# Patient Record
Sex: FEMALE | Race: BLACK OR AFRICAN AMERICAN | NOT HISPANIC OR LATINO | Employment: FULL TIME | URBAN - METROPOLITAN AREA
[De-identification: names, ages, dates, MRNs, and addresses within clinical notes are randomized per-mention and may not be internally consistent; named-entity substitution may affect disease eponyms.]

---

## 2018-02-28 ENCOUNTER — HISTORICAL (OUTPATIENT)
Dept: ADMINISTRATIVE | Facility: HOSPITAL | Age: 43
End: 2018-02-28

## 2018-02-28 LAB
HAV IGM SERPL QL IA: NONREACTIVE
HBV CORE IGM SERPL QL IA: NONREACTIVE
HBV SURFACE AG SERPL QL IA: NEGATIVE
HCV AB SERPL QL IA: NONREACTIVE
HIV 1+2 AB+HIV1 P24 AG SERPL QL IA: NONREACTIVE
T PALLIDUM AB SER QL: NONREACTIVE

## 2018-05-17 ENCOUNTER — HISTORICAL (OUTPATIENT)
Dept: ADMINISTRATIVE | Facility: HOSPITAL | Age: 43
End: 2018-05-17

## 2018-05-17 LAB
ABS NEUT (OLG): 2.3 X10(3)/MCL (ref 2.1–9.2)
BASOPHILS # BLD AUTO: 0.02 X10(3)/MCL
BASOPHILS NFR BLD AUTO: 0 %
EOSINOPHIL # BLD AUTO: 0.18 X10(3)/MCL
EOSINOPHIL NFR BLD AUTO: 4 %
ERYTHROCYTE [DISTWIDTH] IN BLOOD BY AUTOMATED COUNT: 13 % (ref 11.5–14.5)
HCT VFR BLD AUTO: 41.7 % (ref 35–46)
HGB BLD-MCNC: 12.7 GM/DL (ref 12–16)
IMM GRANULOCYTES # BLD AUTO: 0.01 10*3/UL
IMM GRANULOCYTES NFR BLD AUTO: 0 %
LYMPHOCYTES # BLD AUTO: 1.91 X10(3)/MCL
LYMPHOCYTES NFR BLD AUTO: 40 % (ref 13–40)
MCH RBC QN AUTO: 25.4 PG (ref 26–34)
MCHC RBC AUTO-ENTMCNC: 30.5 GM/DL (ref 31–37)
MCV RBC AUTO: 83.4 FL (ref 80–100)
MONOCYTES # BLD AUTO: 0.33 X10(3)/MCL
MONOCYTES NFR BLD AUTO: 7 % (ref 4–12)
NEUTROPHILS # BLD AUTO: 2.3 X10(3)/MCL
NEUTROPHILS NFR BLD AUTO: 48 X10(3)/MCL
PLATELET # BLD AUTO: 228 X10(3)/MCL (ref 130–400)
PMV BLD AUTO: 9.9 FL (ref 7.4–10.4)
RBC # BLD AUTO: 5 X10(6)/MCL (ref 4–5.2)
WBC # SPEC AUTO: 4.8 X10(3)/MCL (ref 4.5–11)

## 2018-07-17 ENCOUNTER — HISTORICAL (OUTPATIENT)
Dept: ADMINISTRATIVE | Facility: HOSPITAL | Age: 43
End: 2018-07-17

## 2018-07-17 LAB
ALBUMIN SERPL-MCNC: 4.1 GM/DL (ref 3.4–5)
ALBUMIN/GLOB SERPL: 1 RATIO (ref 1–2)
ALP SERPL-CCNC: 49 UNIT/L (ref 45–117)
ALT SERPL-CCNC: 16 UNIT/L (ref 12–78)
APPEARANCE, UA: CLEAR
AST SERPL-CCNC: 14 UNIT/L (ref 15–37)
BACTERIA #/AREA URNS AUTO: ABNORMAL /[HPF]
BILIRUB SERPL-MCNC: 0.6 MG/DL (ref 0.2–1)
BILIRUB UR QL STRIP: NEGATIVE
BILIRUBIN DIRECT+TOT PNL SERPL-MCNC: 0.2 MG/DL
BILIRUBIN DIRECT+TOT PNL SERPL-MCNC: 0.4 MG/DL
BUN SERPL-MCNC: 9 MG/DL (ref 7–18)
CALCIUM SERPL-MCNC: 9.2 MG/DL (ref 8.5–10.1)
CHLORIDE SERPL-SCNC: 106 MMOL/L (ref 98–107)
CHOLEST SERPL-MCNC: 134 MG/DL
CHOLEST/HDLC SERPL: 2.5 {RATIO} (ref 0–4.4)
CO2 SERPL-SCNC: 26 MMOL/L (ref 21–32)
COLOR UR: YELLOW
CREAT SERPL-MCNC: 0.9 MG/DL (ref 0.6–1.3)
EST. AVERAGE GLUCOSE BLD GHB EST-MCNC: 117 MG/DL
GLOBULIN SER-MCNC: 4.5 GM/ML (ref 2.3–3.5)
GLUCOSE (UA): NORMAL
GLUCOSE SERPL-MCNC: 79 MG/DL (ref 74–106)
HBA1C MFR BLD: 5.7 % (ref 4.2–6.3)
HDLC SERPL-MCNC: 53 MG/DL
HGB UR QL STRIP: NEGATIVE
HYALINE CASTS #/AREA URNS LPF: ABNORMAL /[LPF]
KETONES UR QL STRIP: NEGATIVE
LDLC SERPL CALC-MCNC: 69 MG/DL (ref 0–130)
LEUKOCYTE ESTERASE UR QL STRIP: 25 LEU/UL
NITRITE UR QL STRIP: ABNORMAL
PH UR STRIP: 6 [PH] (ref 4.5–8)
POTASSIUM SERPL-SCNC: 4 MMOL/L (ref 3.5–5.1)
PROT SERPL-MCNC: 8.6 GM/DL (ref 6.4–8.2)
PROT UR QL STRIP: NEGATIVE
RBC #/AREA URNS AUTO: ABNORMAL /[HPF]
SODIUM SERPL-SCNC: 140 MMOL/L (ref 136–145)
SP GR UR STRIP: 1.02 (ref 1–1.03)
SQUAMOUS #/AREA URNS LPF: ABNORMAL /[LPF]
TRIGL SERPL-MCNC: 58 MG/DL
TSH SERPL-ACNC: 1.84 MIU/L (ref 0.36–3.74)
UROBILINOGEN UR STRIP-ACNC: NORMAL
VLDLC SERPL CALC-MCNC: 12 MG/DL
WBC #/AREA URNS AUTO: ABNORMAL /HPF

## 2018-07-31 ENCOUNTER — HISTORICAL (OUTPATIENT)
Dept: RADIOLOGY | Facility: HOSPITAL | Age: 43
End: 2018-07-31

## 2018-10-09 ENCOUNTER — HISTORICAL (OUTPATIENT)
Dept: RADIOLOGY | Facility: HOSPITAL | Age: 43
End: 2018-10-09

## 2018-10-11 ENCOUNTER — HISTORICAL (OUTPATIENT)
Dept: INTERNAL MEDICINE | Facility: CLINIC | Age: 43
End: 2018-10-11

## 2018-11-06 ENCOUNTER — HISTORICAL (OUTPATIENT)
Dept: ADMINISTRATIVE | Facility: HOSPITAL | Age: 43
End: 2018-11-06

## 2018-11-06 LAB — DEPRECATED CALCIDIOL+CALCIFEROL SERPL-MC: 27.31 NG/ML (ref 30–80)

## 2018-11-20 ENCOUNTER — HISTORICAL (OUTPATIENT)
Dept: RADIOLOGY | Facility: HOSPITAL | Age: 43
End: 2018-11-20

## 2018-12-28 ENCOUNTER — HISTORICAL (OUTPATIENT)
Dept: RADIOLOGY | Facility: HOSPITAL | Age: 43
End: 2018-12-28

## 2019-02-07 ENCOUNTER — HISTORICAL (OUTPATIENT)
Dept: RADIOLOGY | Facility: HOSPITAL | Age: 44
End: 2019-02-07

## 2019-02-19 ENCOUNTER — HISTORICAL (OUTPATIENT)
Dept: RADIOLOGY | Facility: HOSPITAL | Age: 44
End: 2019-02-19

## 2019-08-08 LAB — POC BETA-HCG (QUAL): NEGATIVE

## 2019-09-24 ENCOUNTER — HISTORICAL (OUTPATIENT)
Dept: ADMINISTRATIVE | Facility: HOSPITAL | Age: 44
End: 2019-09-24

## 2019-09-24 LAB
ABS NEUT (OLG): 2.31 X10(3)/MCL (ref 2.1–9.2)
ALBUMIN SERPL-MCNC: 3.7 GM/DL (ref 3.4–5)
ALBUMIN/GLOB SERPL: 0.8 RATIO (ref 1.1–2)
ALP SERPL-CCNC: 54 UNIT/L (ref 45–117)
ALT SERPL-CCNC: 16 UNIT/L (ref 12–78)
AST SERPL-CCNC: 7 UNIT/L (ref 15–37)
BASOPHILS # BLD AUTO: 0 X10(3)/MCL (ref 0–0.2)
BASOPHILS NFR BLD AUTO: 0 %
BILIRUB SERPL-MCNC: 0.2 MG/DL (ref 0.2–1)
BILIRUBIN DIRECT+TOT PNL SERPL-MCNC: <0.1 MG/DL (ref 0–0.2)
BILIRUBIN DIRECT+TOT PNL SERPL-MCNC: ABNORMAL MG/DL
BUN SERPL-MCNC: 15 MG/DL (ref 7–18)
CALCIUM SERPL-MCNC: 8.7 MG/DL (ref 8.5–10.1)
CHLORIDE SERPL-SCNC: 107 MMOL/L (ref 98–107)
CO2 SERPL-SCNC: 31 MMOL/L (ref 21–32)
CREAT SERPL-MCNC: 0.9 MG/DL (ref 0.6–1.3)
DEPRECATED CALCIDIOL+CALCIFEROL SERPL-MC: 17.17 NG/ML (ref 30–80)
EOSINOPHIL # BLD AUTO: 0.1 X10(3)/MCL (ref 0–0.9)
EOSINOPHIL NFR BLD AUTO: 3 %
ERYTHROCYTE [DISTWIDTH] IN BLOOD BY AUTOMATED COUNT: 13.2 % (ref 11.5–14.5)
EST. AVERAGE GLUCOSE BLD GHB EST-MCNC: 117 MG/DL
GLOBULIN SER-MCNC: 4.5 GM/ML (ref 2.3–3.5)
GLUCOSE SERPL-MCNC: 82 MG/DL (ref 74–106)
HAV IGM SERPL QL IA: NONREACTIVE
HBA1C MFR BLD: 5.7 % (ref 4.2–6.3)
HBV CORE IGM SERPL QL IA: NONREACTIVE
HBV SURFACE AG SERPL QL IA: NEGATIVE
HCT VFR BLD AUTO: 42.6 % (ref 35–46)
HCV AB SERPL QL IA: NONREACTIVE
HGB BLD-MCNC: 13 GM/DL (ref 12–16)
HIV 1+2 AB+HIV1 P24 AG SERPL QL IA: NONREACTIVE
IMM GRANULOCYTES # BLD AUTO: 0.01 10*3/UL
IMM GRANULOCYTES NFR BLD AUTO: 0 %
LYMPHOCYTES # BLD AUTO: 2 X10(3)/MCL (ref 0.6–4.6)
LYMPHOCYTES NFR BLD AUTO: 41 %
MCH RBC QN AUTO: 25.8 PG (ref 26–34)
MCHC RBC AUTO-ENTMCNC: 30.5 GM/DL (ref 31–37)
MCV RBC AUTO: 84.7 FL (ref 80–100)
MONOCYTES # BLD AUTO: 0.4 X10(3)/MCL (ref 0.1–1.3)
MONOCYTES NFR BLD AUTO: 8 %
NEUTROPHILS # BLD AUTO: 2.31 X10(3)/MCL (ref 2.1–9.2)
NEUTROPHILS NFR BLD AUTO: 47 %
PLATELET # BLD AUTO: 226 X10(3)/MCL (ref 130–400)
PMV BLD AUTO: 10 FL (ref 7.4–10.4)
POTASSIUM SERPL-SCNC: 3.8 MMOL/L (ref 3.5–5.1)
PROT SERPL-MCNC: 7.3 GM/DL
PROT SERPL-MCNC: 8.2 GM/DL (ref 6.4–8.2)
RBC # BLD AUTO: 5.03 X10(6)/MCL (ref 4–5.2)
SODIUM SERPL-SCNC: 142 MMOL/L (ref 136–145)
T PALLIDUM AB SER QL: NONREACTIVE
TSH SERPL-ACNC: 1.53 MIU/L (ref 0.36–3.74)
WBC # SPEC AUTO: 4.9 X10(3)/MCL (ref 4.5–11)

## 2020-01-16 ENCOUNTER — HISTORICAL (OUTPATIENT)
Dept: RADIOLOGY | Facility: HOSPITAL | Age: 45
End: 2020-01-16

## 2020-02-06 ENCOUNTER — HISTORICAL (OUTPATIENT)
Dept: ENDOSCOPY | Facility: HOSPITAL | Age: 45
End: 2020-02-06

## 2020-02-06 LAB — CRC RECOMMENDATION EXT: NORMAL

## 2020-08-11 LAB — POC BETA-HCG (QUAL): NEGATIVE

## 2020-10-20 ENCOUNTER — HISTORICAL (OUTPATIENT)
Dept: ADMINISTRATIVE | Facility: HOSPITAL | Age: 45
End: 2020-10-20

## 2020-10-20 LAB
ABS NEUT (OLG): 1.97 X10(3)/MCL (ref 2.1–9.2)
BASOPHILS # BLD AUTO: 0 X10(3)/MCL (ref 0–0.2)
BASOPHILS NFR BLD AUTO: 0 %
BUN SERPL-MCNC: 10 MG/DL (ref 7–18)
CALCIUM SERPL-MCNC: 9 MG/DL (ref 8.5–10.1)
CHLORIDE SERPL-SCNC: 111 MMOL/L (ref 98–107)
CO2 SERPL-SCNC: 27 MMOL/L (ref 21–32)
CREAT SERPL-MCNC: 0.9 MG/DL (ref 0.6–1.3)
CREAT/UREA NIT SERPL: 11 MG/DL (ref 12–14)
EOSINOPHIL # BLD AUTO: 0.2 X10(3)/MCL (ref 0–0.9)
EOSINOPHIL NFR BLD AUTO: 4 %
ERYTHROCYTE [DISTWIDTH] IN BLOOD BY AUTOMATED COUNT: 13 % (ref 11.5–14.5)
FERRITIN SERPL-MCNC: 45.8 NG/ML (ref 10–150)
GLUCOSE SERPL-MCNC: 93 MG/DL (ref 74–106)
HCT VFR BLD AUTO: 42.3 % (ref 35–46)
HGB BLD-MCNC: 13.2 GM/DL (ref 12–16)
IMM GRANULOCYTES # BLD AUTO: 0.01 10*3/UL
IMM GRANULOCYTES NFR BLD AUTO: 0 %
IRON SATN MFR SERPL: 23 % (ref 15–50)
IRON SERPL-MCNC: 72 MCG/DL (ref 50–170)
LYMPHOCYTES # BLD AUTO: 2.1 X10(3)/MCL (ref 0.6–4.6)
LYMPHOCYTES NFR BLD AUTO: 46 %
MCH RBC QN AUTO: 26 PG (ref 26–34)
MCHC RBC AUTO-ENTMCNC: 31.2 GM/DL (ref 31–37)
MCV RBC AUTO: 83.3 FL (ref 80–100)
MONOCYTES # BLD AUTO: 0.3 X10(3)/MCL (ref 0.1–1.3)
MONOCYTES NFR BLD AUTO: 6 %
NEUTROPHILS # BLD AUTO: 1.97 X10(3)/MCL (ref 2.1–9.2)
NEUTROPHILS NFR BLD AUTO: 43 %
PLATELET # BLD AUTO: 240 X10(3)/MCL (ref 130–400)
PMV BLD AUTO: 9.7 FL (ref 7.4–10.4)
POTASSIUM SERPL-SCNC: 3.8 MMOL/L (ref 3.5–5.1)
RBC # BLD AUTO: 5.08 X10(6)/MCL (ref 4–5.2)
RET# (OHS): 0.05 X10(6)/MCL (ref 0.02–0.08)
RETICULOCYTE COUNT AUTOMATED (OLG): 0.9 % (ref 0.5–1.5)
SODIUM SERPL-SCNC: 142 MMOL/L (ref 136–145)
TIBC SERPL-MCNC: 312 MCG/DL (ref 250–450)
TRANSFERRIN SERPL-MCNC: 247 MG/DL (ref 200–360)
WBC # SPEC AUTO: 4.6 X10(3)/MCL (ref 4.5–11)

## 2020-11-19 LAB — POC BETA-HCG (QUAL): NEGATIVE

## 2021-01-21 ENCOUNTER — HISTORICAL (OUTPATIENT)
Dept: ADMINISTRATIVE | Facility: HOSPITAL | Age: 46
End: 2021-01-21

## 2021-02-10 ENCOUNTER — HISTORICAL (OUTPATIENT)
Dept: RADIOLOGY | Facility: HOSPITAL | Age: 46
End: 2021-02-10

## 2021-04-01 ENCOUNTER — HISTORICAL (OUTPATIENT)
Dept: ADMINISTRATIVE | Facility: HOSPITAL | Age: 46
End: 2021-04-01

## 2021-08-10 LAB
HPV16+18+H RISK 12 DNA CVX-IMP: NEGATIVE
PAP RECOMMENDATION EXT: NORMAL
PAP SMEAR: NORMAL

## 2021-11-15 ENCOUNTER — PATIENT OUTREACH (OUTPATIENT)
Dept: EMERGENCY MEDICINE | Facility: HOSPITAL | Age: 46
End: 2021-11-15

## 2021-11-16 ENCOUNTER — HISTORICAL (OUTPATIENT)
Dept: ADMINISTRATIVE | Facility: HOSPITAL | Age: 46
End: 2021-11-16

## 2021-12-07 ENCOUNTER — HISTORICAL (OUTPATIENT)
Dept: ADMINISTRATIVE | Facility: HOSPITAL | Age: 46
End: 2021-12-07

## 2021-12-20 ENCOUNTER — PATIENT OUTREACH (OUTPATIENT)
Dept: EMERGENCY MEDICINE | Facility: HOSPITAL | Age: 46
End: 2021-12-20

## 2022-01-04 ENCOUNTER — HISTORICAL (OUTPATIENT)
Dept: ADMINISTRATIVE | Facility: HOSPITAL | Age: 47
End: 2022-01-04

## 2022-04-09 ENCOUNTER — HISTORICAL (OUTPATIENT)
Dept: ADMINISTRATIVE | Facility: HOSPITAL | Age: 47
End: 2022-04-09
Payer: MEDICAID

## 2022-04-27 VITALS
OXYGEN SATURATION: 98 % | WEIGHT: 260.13 LBS | DIASTOLIC BLOOD PRESSURE: 88 MMHG | SYSTOLIC BLOOD PRESSURE: 125 MMHG | HEIGHT: 67 IN | BODY MASS INDEX: 40.83 KG/M2

## 2022-05-17 ENCOUNTER — CLINICAL SUPPORT (OUTPATIENT)
Dept: GYNECOLOGY | Facility: CLINIC | Age: 47
End: 2022-05-17
Payer: MEDICAID

## 2022-05-17 DIAGNOSIS — N93.9 ABNORMAL UTERINE BLEEDING (AUB): Primary | ICD-10-CM

## 2022-05-17 PROCEDURE — 99212 OFFICE O/P EST SF 10 MIN: CPT | Mod: PBBFAC

## 2022-05-17 PROCEDURE — 96372 THER/PROPH/DIAG INJ SC/IM: CPT | Mod: PBBFAC

## 2022-05-17 RX ORDER — MEDROXYPROGESTERONE ACETATE 150 MG/ML
150 INJECTION, SUSPENSION INTRAMUSCULAR
Status: COMPLETED | OUTPATIENT
Start: 2022-05-17 | End: 2022-05-17

## 2022-05-17 RX ORDER — MEDROXYPROGESTERONE ACETATE 150 MG/ML
150 INJECTION, SUSPENSION INTRAMUSCULAR
Status: CANCELLED | OUTPATIENT
Start: 2022-05-17 | End: 2022-05-17

## 2022-05-17 RX ADMIN — MEDROXYPROGESTERONE ACETATE 150 MG: 150 INJECTION, SUSPENSION INTRAMUSCULAR at 10:05

## 2022-05-17 NOTE — LETTER
May 17, 2022      [facility]  [address] [address]  Phone: [phone]       Patient: Tara Potts   YOB: 1975  Date of Visit: 05/17/2022    To Whom It May Concern:    Dhara Potts  was at [facility] on 05/17/2022. The patient may return to work/school on 05/17/2022 no restrictions. If you have any questions or concerns, or if I can be of further assistance, please do not hesitate to contact me.    Sincerely,    Dr Tracy Avelar

## 2022-05-19 ENCOUNTER — TELEPHONE (OUTPATIENT)
Dept: GYNECOLOGY | Facility: CLINIC | Age: 47
End: 2022-05-19
Payer: MEDICAID

## 2022-06-16 ENCOUNTER — CLINICAL SUPPORT (OUTPATIENT)
Dept: GYNECOLOGY | Facility: CLINIC | Age: 47
End: 2022-06-16
Payer: MEDICAID

## 2022-06-16 DIAGNOSIS — Z23 NEED FOR HPV VACCINATION: Primary | ICD-10-CM

## 2022-06-16 PROCEDURE — 90471 IMMUNIZATION ADMIN: CPT | Mod: PBBFAC

## 2022-06-16 PROCEDURE — 90651 9VHPV VACCINE 2/3 DOSE IM: CPT | Mod: PBBFAC

## 2022-06-16 PROCEDURE — 99212 OFFICE O/P EST SF 10 MIN: CPT | Mod: PBBFAC

## 2022-06-16 RX ADMIN — HUMAN PAPILLOMAVIRUS 9-VALENT VACCINE, RECOMBINANT 0.5 ML: 30; 40; 60; 40; 20; 20; 20; 20; 20 INJECTION, SUSPENSION INTRAMUSCULAR at 09:06

## 2022-06-22 ENCOUNTER — HOSPITAL ENCOUNTER (OUTPATIENT)
Dept: RADIOLOGY | Facility: HOSPITAL | Age: 47
Discharge: HOME | End: 2022-06-22
Attending: FAMILY MEDICINE
Payer: MEDICAID

## 2022-06-22 ENCOUNTER — TELEPHONE (OUTPATIENT)
Dept: INTERNAL MEDICINE | Facility: CLINIC | Age: 47
End: 2022-06-22
Payer: MEDICAID

## 2022-06-22 ENCOUNTER — OFFICE VISIT (OUTPATIENT)
Dept: ORTHOPEDICS | Facility: CLINIC | Age: 47
End: 2022-06-22
Payer: MEDICAID

## 2022-06-22 VITALS
SYSTOLIC BLOOD PRESSURE: 145 MMHG | BODY MASS INDEX: 41.78 KG/M2 | DIASTOLIC BLOOD PRESSURE: 98 MMHG | WEIGHT: 260 LBS | HEIGHT: 66 IN

## 2022-06-22 DIAGNOSIS — M77.31 CALCANEAL SPUR OF FOOT, RIGHT: ICD-10-CM

## 2022-06-22 DIAGNOSIS — M72.2 PLANTAR FASCIITIS OF RIGHT FOOT: Primary | ICD-10-CM

## 2022-06-22 DIAGNOSIS — M79.671 PAIN IN RIGHT FOOT: ICD-10-CM

## 2022-06-22 PROCEDURE — 73630 X-RAY EXAM OF FOOT: CPT | Mod: TC,RT

## 2022-06-22 PROCEDURE — 99213 OFFICE O/P EST LOW 20 MIN: CPT | Mod: PBBFAC

## 2022-06-22 RX ORDER — MELOXICAM 7.5 MG/1
7.5 TABLET ORAL 2 TIMES DAILY PRN
Qty: 60 TABLET | Refills: 0 | Status: SHIPPED | OUTPATIENT
Start: 2022-06-22 | End: 2022-08-17

## 2022-06-22 RX ORDER — SERTRALINE HYDROCHLORIDE 25 MG/1
25 TABLET, FILM COATED ORAL DAILY
COMMUNITY
Start: 2022-04-10

## 2022-06-22 RX ORDER — MEDROXYPROGESTERONE ACETATE 150 MG/ML
INJECTION, SUSPENSION INTRAMUSCULAR
COMMUNITY
End: 2022-08-17 | Stop reason: SDUPTHER

## 2022-06-22 NOTE — PROGRESS NOTES
Subjective:     Chief Complaint: Tara Potts is a 46 y.o. female who had concerns including Pain of the Right Foot.      Interval history:   1 year history of plantar fasciitis in R foot, worsened after fracture of L 5th metatarsal last year and she was required to compensate with R foot for pain. Symptoms previously under control with daily stretching, PRN NSAIDs, but worsened after being relatively immobile 2/2 COVID a few months ago Tried multiple stretching exercises, shoe inserts, topical Voltaren, and lately, has been taking about 2000mg Tylenol, 800mg Ibuprofen three times daily for the last 2 months. Never been to physical therapy. Pain at present 6/10, moderate in severity. Gait unimpaired.       Review of Systems   Musculoskeletal: Negative for falls.   Neurological: Negative for loss of balance, numbness and paresthesias.        Objective:     Vitals:    06/22/22 0850   BP: (!) 145/98       General: Tara is well-developed, well-nourished, appears stated age, in no acute distress, alert and oriented to time, place and person.           Right Ankle/Foot Exam     Tenderness   The patient is tender to palpation of the plantar arch.    Pain   The patient exhibits pain of the plantar arch.    Range of Motion   Ankle Joint   Dorsiflexion:  25 normal   Plantar flexion: 45   Subtalar Joint   Inversion: normal   Eversion: normal   First MTP Joint: normal    Muscle Strength   The patient has normal right ankle strength.    Other   Sensation: normal    Left Ankle/Foot Exam     Range of Motion   Ankle Joint  Dorsiflexion: 25   Plantar flexion: 45     Subtalar Joint   Inversion: normal   Eversion: normal   First MTP Joint: normal    Muscle Strength   The patient has normal left ankle strength.    Other   Sensation: normal      Vascular Exam     Right Pulses  Dorsalis Pedis:      2+  Posterior Tibial:      2+        Left Pulses  Dorsalis Pedis:      2+  Posterior Tibial:      2+        Imaging:   EXAMINATION:  XR  FOOT COMPLETE 3 VIEW RIGHT     CLINICAL HISTORY:  . Pain in right foot     TECHNIQUE:  AP, lateral, and oblique views of the right foot were performed.     COMPARISON:  None     FINDINGS:  There are no fractures seen.  There is no dislocation.  There is spurring of the plantar aspect of the calcaneus.     Impression:     No acute abnormalities are seen.        Electronically signed by: David Herrera MD  Date:                                            06/22/2022  Time:                                           09:15      Assessment:     1. Plantar fasciitis of right foot    2. Calcaneal spur of foot, right      Plan:        Dx: Plantar fasciitis, right foot. Chronic, moderate exacerbation. Discussed with patient diagnosis and treatment recommendations.   Imaging: prior radiological studies independently reviewed; discussed with patient; agree with radiologist interpretation.   Treatment Plan: Formal physical therapy x 12 weeks, shoe inserts, HEP, Mobic 7.5mg BID PRN.   Weight Management: is paramount. Recommend at least 10lbs weight loss .   Procedure: Discussed CSI/VSI as treatment options; patient not a candidate for CSI/VSI.   Activity: Activity as tolerated; HEP to include aerobic conditioning and strength training with non-painful activity. ROM/STG exercises. Proper footware; assistive devises to avoid limping.   Therapy: Physical Therapy.   Medication: start Mobic 7.5mg BID PRN; medication precautions given. Discussed proper dosing of OTC Tylenol, ibuprofen. Instructed to hold ibuprofen while taking meloxicam. If taking Tylenol, recommended 1000 mg TID for 3 days to receive anti-inflammatory benefit.    RTC: PRN; As needed.         Delfino Mcrae M.D. -III  [facility]      Patient questionnaires may have been collected.

## 2022-06-22 NOTE — LETTER
Therapy Prescription  [facility]  Office Phone: [phone]  Office Fax: [phone]      Patient Name: Tara Potts  : 1975  MRN: [id number]  Date of Service: 2022    Type of Therapy:  Physical Therapy    Reasons for therapy:  Evaluate & Treat  Modality of Choice  Home Exercise Program    Duration:  Three times per week for twelve weeks    Goals:  Improve range of motion  Improve Strength  Improve Mobility  Improve Function  Decrease Symptoms    Diagnosis:  Plantar Fasciitis, right    Provider:  Delfino Mcrae M.D.

## 2022-06-29 ENCOUNTER — TELEPHONE (OUTPATIENT)
Dept: FAMILY MEDICINE | Facility: CLINIC | Age: 47
End: 2022-06-29
Payer: MEDICAID

## 2022-06-29 NOTE — TELEPHONE ENCOUNTER
Physical therapist called, patient insurance is not in network, unable to accept patient.  Thank you.

## 2022-07-01 NOTE — PROGRESS NOTES
Faculty Attestation: Tara Potts  was seen in Sports Medicine Clinic. Patient seen and evaluated at the time of the visit. History of Present Illness, Physical Exam, and Assessment and Plan reviewed. Treatment plan is reasonable and appropriate. Compliance with treatment recommendations is important.  Radiology images independently reviewed and agree with radiologist interpretation.       Stephani Fagan MD  Sports Medicine

## 2022-07-05 ENCOUNTER — PATIENT OUTREACH (OUTPATIENT)
Dept: EMERGENCY MEDICINE | Facility: HOSPITAL | Age: 47
End: 2022-07-05
Payer: MEDICAID

## 2022-07-05 ENCOUNTER — PATIENT MESSAGE (OUTPATIENT)
Dept: EMERGENCY MEDICINE | Facility: HOSPITAL | Age: 47
End: 2022-07-05
Payer: MEDICAID

## 2022-07-05 NOTE — PROGRESS NOTES
Spoke with patient,had a discussion  regarding her PCP and needing a much needed follow up appointment. Reminded her that it has been close to 3 years since her last visit with Dr Delvin Connolly 10/20/20. States her feet has been causing her pain and she is also having issues with carpal tunnel. She did have a recent visit 06/22/22 in Ortho/Sports Med for an evaluation on her feet. She was instructed to start going to physical therapy due to left foot plantar fasciitis and right foot bone spur. No injections were given but was prescribed Mobic for pain along with Tylenol. Has not started going to formal PT ,says she is unable to find one that accepts Medicaid. Informed patient that I would mail her a list of PT providers that accepts her insurance.States she was told by Ortho her B/P is a little on the high side and she needs to follow up with her PCP for management. She did called the IM Clinic to scheduled an appointment but has not heard back regarding an appointment. Contacted IM Clinic so they can reach out to patient to scheduled a follow up appointment. Explained to patient ,she may be assigned a new PCP since it has been over 2 years since her last visit with Dr Connolly. Advised to monitor her B/P at home and keep a log of readings to have for doctor visit. and to utilized [facility] for non urgent issues until PCP visit. Voices understanding.

## 2022-08-16 ENCOUNTER — CLINICAL SUPPORT (OUTPATIENT)
Dept: GYNECOLOGY | Facility: CLINIC | Age: 47
End: 2022-08-16
Payer: MEDICAID

## 2022-08-16 DIAGNOSIS — N93.9 ABNORMAL UTERINE BLEEDING (AUB): Primary | ICD-10-CM

## 2022-08-16 PROCEDURE — 99212 OFFICE O/P EST SF 10 MIN: CPT | Mod: PBBFAC

## 2022-08-16 PROCEDURE — 96372 THER/PROPH/DIAG INJ SC/IM: CPT | Mod: PBBFAC

## 2022-08-16 RX ORDER — MEDROXYPROGESTERONE ACETATE 150 MG/ML
150 INJECTION, SUSPENSION INTRAMUSCULAR
Status: COMPLETED | OUTPATIENT
Start: 2022-08-16 | End: 2022-08-16

## 2022-08-16 RX ADMIN — MEDROXYPROGESTERONE ACETATE 150 MG: 150 INJECTION, SUSPENSION, EXTENDED RELEASE INTRAMUSCULAR at 08:08

## 2022-08-16 NOTE — PROGRESS NOTES
Depo-provera injection administered per Katie Castano NP order set abstracted from [facility]. Patient tolerated well and left in stable condition.

## 2022-08-17 ENCOUNTER — OFFICE VISIT (OUTPATIENT)
Dept: GYNECOLOGY | Facility: CLINIC | Age: 47
End: 2022-08-17
Payer: MEDICAID

## 2022-08-17 VITALS
RESPIRATION RATE: 18 BRPM | TEMPERATURE: 98 F | OXYGEN SATURATION: 98 % | BODY MASS INDEX: 43.33 KG/M2 | HEART RATE: 82 BPM | HEIGHT: 66 IN | SYSTOLIC BLOOD PRESSURE: 142 MMHG | WEIGHT: 269.63 LBS | DIASTOLIC BLOOD PRESSURE: 91 MMHG

## 2022-08-17 DIAGNOSIS — N93.9 ABNORMAL UTERINE BLEEDING (AUB): ICD-10-CM

## 2022-08-17 DIAGNOSIS — Z12.31 VISIT FOR SCREENING MAMMOGRAM: ICD-10-CM

## 2022-08-17 DIAGNOSIS — Z01.419 ENCOUNTER FOR ANNUAL ROUTINE GYNECOLOGICAL EXAMINATION: Primary | ICD-10-CM

## 2022-08-17 PROCEDURE — 3008F PR BODY MASS INDEX (BMI) DOCUMENTED: ICD-10-PCS | Mod: CPTII,,, | Performed by: NURSE PRACTITIONER

## 2022-08-17 PROCEDURE — 1160F RVW MEDS BY RX/DR IN RCRD: CPT | Mod: CPTII,,, | Performed by: NURSE PRACTITIONER

## 2022-08-17 PROCEDURE — 1159F PR MEDICATION LIST DOCUMENTED IN MEDICAL RECORD: ICD-10-PCS | Mod: CPTII,,, | Performed by: NURSE PRACTITIONER

## 2022-08-17 PROCEDURE — 3077F SYST BP >= 140 MM HG: CPT | Mod: CPTII,,, | Performed by: NURSE PRACTITIONER

## 2022-08-17 PROCEDURE — 99396 PREV VISIT EST AGE 40-64: CPT | Mod: S$PBB,,, | Performed by: NURSE PRACTITIONER

## 2022-08-17 PROCEDURE — 1159F MED LIST DOCD IN RCRD: CPT | Mod: CPTII,,, | Performed by: NURSE PRACTITIONER

## 2022-08-17 PROCEDURE — 3080F PR MOST RECENT DIASTOLIC BLOOD PRESSURE >= 90 MM HG: ICD-10-PCS | Mod: CPTII,,, | Performed by: NURSE PRACTITIONER

## 2022-08-17 PROCEDURE — 99213 OFFICE O/P EST LOW 20 MIN: CPT | Mod: PBBFAC | Performed by: NURSE PRACTITIONER

## 2022-08-17 PROCEDURE — 1160F PR REVIEW ALL MEDS BY PRESCRIBER/CLIN PHARMACIST DOCUMENTED: ICD-10-PCS | Mod: CPTII,,, | Performed by: NURSE PRACTITIONER

## 2022-08-17 PROCEDURE — 99396 PR PREVENTIVE VISIT,EST,40-64: ICD-10-PCS | Mod: S$PBB,,, | Performed by: NURSE PRACTITIONER

## 2022-08-17 PROCEDURE — 3008F BODY MASS INDEX DOCD: CPT | Mod: CPTII,,, | Performed by: NURSE PRACTITIONER

## 2022-08-17 PROCEDURE — 3077F PR MOST RECENT SYSTOLIC BLOOD PRESSURE >= 140 MM HG: ICD-10-PCS | Mod: CPTII,,, | Performed by: NURSE PRACTITIONER

## 2022-08-17 PROCEDURE — 3080F DIAST BP >= 90 MM HG: CPT | Mod: CPTII,,, | Performed by: NURSE PRACTITIONER

## 2022-08-17 RX ORDER — MEDROXYPROGESTERONE ACETATE 150 MG/ML
150 INJECTION, SUSPENSION INTRAMUSCULAR
Status: DISCONTINUED | OUTPATIENT
Start: 2022-11-17 | End: 2023-08-18

## 2022-08-17 NOTE — PROGRESS NOTES
"  Subjective:       Patient ID: Tara Potts is a 46 y.o. female.    Chief Complaint:  Well Woman    History of Present Illness  Pt is  here for annual gyn exam. Hx of mild dysplasia 18 yrs ago with cryotherapy. All paps since negative. Last pap 3/2021-NIL; HPV negative. Pt with hx of TL for contraception. Currently on depo provera for AUB-L since off and on since . Pt is amenorrheic and wishes to continue Depo. Denies any vaginal bleeding or spotting. Denies vaginal discharge, itching and declines any STI screening today, no sexually active. Hx of renal stent in the past secondary to chronic UTIs. MG-2/10/21-BIRADS 1. Denies breast complaints. Denies tobacco use. HPV vaccine completed. Denies fly hx of breast, ovarian or uterine cancer. Admits to [facility] and Mat uncle with colon cancer. Breast cancer in paternal aunt.    GYN & OB History  No LMP recorded. Patient has had an injection.     Review of patient's allergies indicates:   Allergen Reactions    Sulfa (sulfonamide antibiotics) Hives, Itching, Nausea Only, Palpitations, Rash and Shortness Of Breath     Past Medical History:   Diagnosis Date    Abnormal Pap smear of cervix     Breast disorder     Hypertension      OB History    Para Term  AB Living   4 4           SAB IAB Ectopic Multiple Live Births                  # Outcome Date GA Lbr Tony/2nd Weight Sex Delivery Anes PTL Lv   4 Para            3 Para            2 Para            1 Para                 Review of Systems  Review of Systems    Negative except for pertinent findings for positives per HPI     Objective:    Physical Exam    BP (!) 142/91 (BP Location: Left arm, Patient Position: Sitting, BP Method: Large (Automatic))   Pulse 82   Temp 98.2 °F (36.8 °C)   Resp 18   Ht 5' 6" (1.676 m)   Wt 122.3 kg (269 lb 9.6 oz)   SpO2 98%   BMI 43.51 kg/m²   GENERAL: Well-developed female in no acute distress.  SKIN: Normal to inspection, warm and intact.  BREASTS: No masses, " lumps, discharge, tenderness.  VULVA: General appearance normal; external genitalia with no lesions or erythema.  VAGINA: Mucosa normal, pink, no discharge or lesions.  CERVIX: Grossly normal, pink, no erythema or discharge.  BIMANUAL EXAM: Limited exam d/t body habitus. Non tender. Nicolas adnexa reveal no evidence of masses, tenderness.  PSYCHIATRIC: Patient is oriented to person, place, and time. Mood and affect are normal.    Assessment:       1. Encounter for annual routine gynecological examination    2. Abnormal uterine bleeding (AUB)    3. Visit for screening mammogram       Plan:   Tara was seen today for well woman.    Diagnoses and all orders for this visit:    Encounter for annual routine gynecological examination    Abnormal uterine bleeding (AUB)  -     medroxyPROGESTERone (DEPO-PROVERA) injection 150 mg    Visit for screening mammogram  -     Mammo Digital Screening Bilat; Future    Pelvic today, pap utd per ACOG  MMG ordered.  Continue Depo. Long term use of Depo can cause bone weakening. Diet high in calcium and vit. D. Pt instructed to increase dietary calcium and vit. D intake. Encouraged dietary calcium rich foods like broccoli, mustard, turnip greens, oranges, dairy products, sardines, salmon, and almonds. Encouraged Vit. D absorption with 30 mins in sun 5 days/week and fatty fish. Increase muscle strengthening and aerobic exercise (walking, climbing, bicycle) at least 30 mins for 3 times/week.   Follow up in about 1 year (around 8/17/2023) for annual exam.

## 2022-08-23 ENCOUNTER — OFFICE VISIT (OUTPATIENT)
Dept: INTERNAL MEDICINE | Facility: CLINIC | Age: 47
End: 2022-08-23
Payer: MEDICAID

## 2022-08-23 DIAGNOSIS — I10 PRIMARY HYPERTENSION: ICD-10-CM

## 2022-08-23 DIAGNOSIS — Z87.310 H/O HEALED FRAGILITY FRACTURE: ICD-10-CM

## 2022-08-23 DIAGNOSIS — M72.2 PLANTAR FASCIITIS: ICD-10-CM

## 2022-08-23 DIAGNOSIS — Z00.00 WELLNESS EXAMINATION: ICD-10-CM

## 2022-08-23 DIAGNOSIS — D64.9 ANEMIA, UNSPECIFIED TYPE: ICD-10-CM

## 2022-08-23 DIAGNOSIS — S92.902D CLOSED FRACTURE OF LEFT FOOT WITH ROUTINE HEALING, SUBSEQUENT ENCOUNTER: ICD-10-CM

## 2022-08-23 DIAGNOSIS — J12.82 PNEUMONIA DUE TO COVID-19 VIRUS: ICD-10-CM

## 2022-08-23 DIAGNOSIS — U07.1 PNEUMONIA DUE TO COVID-19 VIRUS: ICD-10-CM

## 2022-08-23 DIAGNOSIS — N93.8 DYSFUNCTIONAL UTERINE BLEEDING: Primary | ICD-10-CM

## 2022-08-23 DIAGNOSIS — E66.9 OBESITY, UNSPECIFIED CLASSIFICATION, UNSPECIFIED OBESITY TYPE, UNSPECIFIED WHETHER SERIOUS COMORBIDITY PRESENT: ICD-10-CM

## 2022-08-23 PROBLEM — S92.909A FOOT FRACTURE: Status: ACTIVE | Noted: 2022-08-23

## 2022-08-23 PROBLEM — E66.01 MORBID OBESITY: Status: ACTIVE | Noted: 2022-08-23

## 2022-08-23 PROCEDURE — 99214 OFFICE O/P EST MOD 30 MIN: CPT | Mod: PBBFAC | Performed by: INTERNAL MEDICINE

## 2022-08-23 RX ORDER — HYDROCHLOROTHIAZIDE 12.5 MG/1
12.5 TABLET ORAL DAILY
Qty: 30 TABLET | Refills: 11 | Status: SHIPPED | OUTPATIENT
Start: 2022-08-23 | End: 2023-08-23
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
## 2022-09-14 NOTE — PROGRESS NOTES
[facility] Care Entered On:  1/19/2022 11:58 CST    Performed On:  1/19/2022 11:19 CST by Sirisha Keller LPN               Discharge Past 30 Days   Visit to the Hospital in the Last 30 Days :   Emergency department (ED) visit   Reason for Choosing ED for Care :   Believes the problem too serious for doctor office or clinic   Perception of Change in Health Status DC :   Improving   Discharged To :   Home independently   DC Instructions :   Received discharge instructions , Understands discharge instructions    Education Provided :   ED utilization, Importance of keeping appointments, Medication Compliance, Diet Compliance   Discharge Past 30 Days Addntl Comments :   Called and spoke with patient ,recently tested positive for COVID 01/17/22. Says her symptoms are improving, reports she is still coughing really bad but has only started taking the Rx cough medication yesterday cause the pharmacy had to ordered the medication. Instructed to get plenty of rest and to stay hydrated drinking plenty of fluids and also to remain in quarantine until she no longer having any symptoms. Has not yet called her PCP for a follow up. Stressed the importance of re-establishing healthcare with Dr Laws in IM Clinic at [facility] so her Rx medication can be written without any lapsed or missed dosages.  Informed her that it has been over 1 year since last visit with PCP. Advised to remained compliant in taking prescribed medications and continue with COVID precations such as social distancing ,wearing mask ,good hand washing. Advised to utilized C for non urgent matters but if her symptoms should worsen reports to the ED. Voices understanding.     Sirisha Keller LPN - 1/19/2022 11:19 CST   Barriers to Care   [facility] Eat Less Than You Should :   No   [facility] Shut Off Services to Your Home :   No   [facility] No Regular Place to Live :   No   SDoH Needed Provider but Costs too Much :   No   SDoH Help Reading and Writing Paper Work :   No   SDoH Feel  Lonely Often :   No   [facility] Missed Appt/Meds- No Transportation :   No   [facility]oH Call Dr To Be Seen Right Away :   Yes   [facility] Medical Problems Cause ED Visit :   No   [facility] Other Problems Affecting Health :   No   [facility] Reviewed :   Yes   SDoH Dentist Seen in Last Year :   No   Sirisha Keller LPN 1/19/2022 11:19 CST   Prescriptions   Medication Reconcilation Completed :   Unknown   Medication Prescriptions Filled After DC :   Confirms all medications filled , Confirms taking medications as prescribed    Questions About Meds Prescribed at DC :   Denies any questions or concerns                    Sirisha Keller LPN - 1/19/2022 11:19 CST   Appointments   Follow-Up Appt Scheduled :   No   Follow-Up Appointment Status :   Has not had opportunity to call for appointment   PCP Visit Upcoming :   No   PCP Visit Within Year :   No   Follow-Up Specialist Appt Scheduled :   Yes   Type of Specialist :   [facility] GYN NP 2  02/15/2022   Follow-Up Lab Appt Scheduled :   No   Follow-Up Date :   2/15/2022 CST   Follow-Up Radiology Appt Scheduled :   Yes   Radiology Orders :   [facility] Mammo 02/15/2022   Sirisha Keller LPN 1/19/2022 11:19 CST   Navigation   Initial Assesment Completed By :   Phone   ED FIN :   [id number]   [facility] Navigation Call Log :   Second follow up call   Referral To HE Care :   NA   Transportation Arrangements Made :   Yes   Providers Patient Visited Last Year :   Katie Crowder   Root Causes for High-ED Utilization :   Lack of access to care   Plan: :   Educated patient on process of establishing PCP, Educated on appropriate ED utilization, Educated on alternate means of health care; ie urgent care when PCP not available, Educated on importance of follow up care with PCP, Educated on importance of follow up preventative dental care with dentist, Budget-friendly health eating education given   Participation in Activity Designed to Address Lack of Annual Ambulatory  or Preventative Care Visit? :   Yes   Participation in Activity Designed to Address Avoidable ED Utilization? :   Yes   During Current Measurement Year, Did Enrollee Receive Education Regarding Outpatient Primary Care Options? :   Yes   During Current Measurement Year, Did Enrollee Receive an Appt Reminder 24-48 Hours Before a Scheduled Appt? :   Yes   During Current Measurement Year, Did the Network Provider Schedule and Appt or Provide a Referral to Enrollee? :   Yes   Education Provided :   Verbal, Written   Sirisha Keller LPN - 1/19/2022 11:19 CST     Result type: [facility] - Text  Result date: January 19, 2022 11:19 CST  Result status: Auth (Verified)  Result title: [facility]  Performed by: Sirisha Keller LPN on January 19, 2022 11:19 CST  Verified by: Sirisha Keller LPN on January 19, 2022 11:19 CST  Encounter info: [id number], [facility]NAGEMENT, Utilization Coordination, 11/12/2021 -

## 2022-09-14 NOTE — PROGRESS NOTES
Original encounter date 11/15/2021 in [employer] EMR. Information placed in Epic for continuity purposes.      HealthE Care Entered On:  11/15/2021 11:12 CST    Performed On:  11/15/2021 10:53 CST by Sirisha Keller LPN               Discharge Past 30 Days   Visit to the Hospital in the Last 30 Days :   Emergency department (ED) visit   Reason for Choosing ED for Care :   Believes the problem too serious for doctor office or clinic   Perception of Change in Health Status DC :   Improving   Discharged To :   Home independently   DC Instructions :   Received discharge instructions , Understands discharge instructions    Education Provided :   ED utilization, Importance of keeping appointments, Medication Compliance, Diet Compliance   Discharge Past 30 Days Addntl Comments :   Called and spoke with patient ,recent ED visit for left foot 5th MT fracture. Says she is breaking her pain pill in half when she needs to take it caused she does not like how it makes her feel. Instructed patient to take ibuprofen if her foot pain is not to bad and to only take the pain medication for more severe pain. Verified she still has the LLE splint applied in the ED. Advised to elevate her left foot when sitting or laying down to remain NWB with crutches until [facility] ortho appointment. Ortho referral has been accepted with a pending appointment for 1-2 weeks with Sports Medicine. Ortho clinic provided to call for an update on appointment date. Stressed the importance of following up with her PCP since her last visit was 1 year ago and advised to call for a appointment. Advised to utilized urgent care for non urgent matters until PCP appointment follow up. Voices understanding.     Sirisha Keller LPN - 11/15/2021 10:53 CST   Barriers to Care   SDoH Eat Less Than You Should :   No   SDoH Shut Off Services to Your Home :   No   SDoH No Regular Place to Live :   No   SDoH Needed Provider but Costs too Much :   No   SDoH Help Reading and  Writing Paper Work :   No   SDoH Feel Lonely Often :   No   SDoH Missed Appt/Meds- No Transportation :   No   SDoH Call Dr To Be Seen Right Away :   Yes   SDoH Medical Problems Cause ED Visit :   No   SDoH Other Problems Affecting Health :   No   SDoH Reviewed :   Yes   SDoH Dentist Seen in Last Year :   No   Sirisha Keller LPN - 11/15/2021 10:53 CST   Prescriptions   Medication Reconcilation Completed :   Unknown   Medication Prescriptions Filled After DC :   Confirms all medications filled , Confirms taking medications as prescribed    Questions About Meds Prescribed at DC :   Denies any questions or concerns                    Sirisha Keller LPN KADE - 11/15/2021 10:53 CST   Appointments   Follow-Up Appt Scheduled :   No   Follow-Up Appointment Status :   Has not had opportunity to call for appointment   PCP Visit Upcoming :   No   PCP Visit Within Year :   No   Follow-Up Specialist Appt Scheduled :   Yes   Type of Specialist :   Pending [facility] Ortho appt.  [facility] GYN Katie Castano NP   Follow-Up Lab Appt Scheduled :   No   Follow-Up Date :   8/17/2022 CDT   Follow-Up Radiology Appt Scheduled :   No   Krisnha MAGEDSirisha KADE - 11/15/2021 10:53 CST   Navigation   Initial Assesment Completed By :   Phone   ED FIN :   [id number]   MCIP Navigation Call Log :   Initial MCIP contact   Referral To HE Care :   NA   Transportation Arrangements Made :   Yes   Providers Patient Visited Last Year :   Katie Mcqueen NP   Root Causes for High-ED Utilization :   Lack of access to care   Plan: :   Educated on appropriate ED utilization, Educated on alternate means of health care; ie urgent care when PCP not available, Educated on importance of follow up care with PCP, Educated on importance of follow up preventative dental care with dentist, Budget-friendly health eating education given   Participation in Activity Designed to Address Lack of Annual Ambulatory or Preventative Care Visit? :   Yes   Participation in Activity  Designed to Address Avoidable ED Utilization? :   Yes   During Current Measurement Year, Did Enrollee Receive Education Regarding Outpatient Primary Care Options? :   Yes   During Current Measurement Year, Did Enrollee Receive an Appt Reminder 24-48 Hours Before a Scheduled Appt? :   Yes   Education Provided :   Verbal, Written   Sirisha Keller LPN - 11/15/2021 10:53 CST     Result type: HealthE Care - Text  Result date: November 15, 2021 10:53 CST  Result status: Auth (Verified)  Result title: HealthE Care  Performed by: Sirisha Keller LPN on November 15, 2021 10:53 CST  Verified by: Sirisha Keller LPN on November 15, 2021 10:53 CST  Encounter info: [id number], [facility] COMMUNITY CARE MANAGEMENT, Utilization Coordination, 11/12/2021 -

## 2022-09-14 NOTE — PATIENT INSTRUCTIONS
* Final Report *    Education Note (Verified)  Patient Education Materials Follows:  Why Should I Have My Own Doctor or Nurse Practitioner (PCP) to Take Care of Me  What is a PCP (Primary Care Provider)?                    A primary care provider is a doctor or nurse practitioner who you can call for an appointment and will see you when you are sick.    You will also be seen at scheduled appointment times during the year to check on your diabetes, or high blood pressure, or heart disease.    Why see the same PCP (doctor/nurse practitioner)?  You can be seen faster when you are sick                                                                                                                                                                                                                                                                                                          You, the PCP (doctor/nurse practitioner) and the office staff get to know each other; you begin to trust them to care for you. You take part in your health choices.   All of you together are a team.    Your medicine is looked at every time you visit, to be sure you are taking the medicine, as the PCP (doctor/nurse practitioner) ordered.  Your PCP (doctor/nurse practitioner) and their staff help keep you healthy and out of the hospital.  They can catch sicknesses earlier by ordering tests once a year to stop or prevent the sickness from getting worse.                                                     Your PCP (doctor/nurse practitioner) can send you to providers who specialize (heart/bone/lung) if you need.  They and their office staff help keep track of your seeing other providers (doctors/nurse practitioners) and tests (CT/ MRIs/ X Rays)) taken.                                          PCPs want you to stay healthy.  Let us care for you.                                           Nutrition  Budget-Friendly Healthy Eating  There are many ways to  "save money at the grocery store and continue to eat healthy. You can be successful if you:   Plan meals according to your budget.   Make a grocery list and only purchase food according to your grocery list.   Prepare food yourself.  What are tips for following this plan?    Reading food labels   Compare food labels between brand name foods and the store brand. Often the nutritional value is the same, but the store brand is lower cost.   Look for products that do not have added sugar, fat, or salt (sodium). These often cost the same but are healthier for you. Products may be labeled as:  ? Sugar-free.  ? Nonfat.  ? Low-fat.  ? Sodium-free.  ? Low-sodium.   Look for lean ground beef labeled as at least 92% lean and 8% fat.  Shopping   Buy only the items on your grocery list and go only to the areas of the store that have the items on your list.   Use coupons only for foods and brands you normally buy. Avoid buying items you wouldn't normally buy simply because they are on sale.   Check online and in newspapers for weekly deals.   Buy healthy items from the bulk bins when available, such as herbs, spices, flour, pasta, nuts, and dried fruit.   Buy fruits and vegetables that are in season. Prices are usually lower on in-season produce.   Look at the unit price on the price tag. Use it to compare different brands and sizes to find out which item is the best deal.   Choose healthy items that are often low-cost, such as carrots, potatoes, apples, bananas, and oranges. Dried or canned beans are a low-cost protein source.   Buy in bulk and freeze extra food. Items you can buy in bulk include meats, fish, poultry, frozen fruits, and frozen vegetables.   Avoid buying "ready-to-eat" foods, such as pre-cut fruits and vegetables and pre-made salads.   If possible, shop around to discover where you can find the best prices. Consider other retailers such as dollar stores, larger wholesale stores, local fruit and vegetable stands, " "and [employer].   Do not shop when you are hungry. If you shop while hungry, it may be hard to stick to your list and budget.   Resist impulse buying. Use your grocery list as your official plan for the week.   Buy a variety of vegetables and fruits by purchasing fresh, frozen, and canned items.   Look at the top and bottom shelves for deals. Foods at eye level (eye level of an adult or child) are usually more expensive.   Be efficient with your time when shopping. The more time you spend at the store, the more money you are likely to spend.   To save money when choosing more expensive foods like meats and dairy:  ? Choose cheaper cuts of meat, such as bone-in chicken thighs and drumsticks instead of skinless and boneless chicken. When you are ready to prepare the chicken, you can remove the skin yourself to make it healthier.  ? Choose lean meats like chicken or turkey instead of beef.  ? Choose canned seafood, such as tuna, salmon, or sardines.  ? Buy eggs as a low-cost source of protein.  ? Buy dried beans and peas, such as lentils, split peas, or kidney beans instead of meats. Dried beans and peas are a good alternative source of protein.  ? Buy the larger tubs of yogurt instead of individual-sized containers.   Choose water instead of sodas and other sweetened beverages.   Avoid buying chips, cookies, and other "junk food." These items are usually expensive and not healthy.  Cooking   Make extra food and freeze the extras in meal-sized containers or in individual portions for fast meals and snacks.   Pre-cook on days when you have extra time to prepare meals in advance. You can keep these meals in the fridge or freezer and reheat for a quick meal.   When you come home from the grocery store, wash, peel, and cut fruits and vegetables so they are ready to use and eat. This will help reduce food waste.  Meal planning   Do not eat out or get fast food. Prepare food at home.   Make a grocery list and make sure " "to bring it with you to the store. If you have a smart phone, you could use your phone to create your shopping list.   Plan meals and snacks according to a grocery list and budget you create.   Use leftovers in your meal plan for the week.   Look for recipes where you can cook once and make enough food for two meals.   Include budget-friendly meals like stews, casseroles, and stir-washington dishes.   Try some meatless meals or try "no cook" meals like salads.   Make sure that half your plate is filled with fruits or vegetables. Choose from fresh, frozen, or canned fruits and vegetables. If eating canned, remember to rinse them before eating. This will remove any excess salt added for packaging.  Summary   Eating healthy on a budget is possible if you plan your meals according to your budget, purchase according to your budget and grocery list, and prepare food yourself.   Tips for buying more food on a limited budget include buying generic brands, using coupons only for foods you normally buy, and buying healthy items from the bulk bins when available.   Tips for buying cheaper food to replace expensive food include choosing cheaper, lean cuts of meat, and buying dried beans and peas.  This information is not intended to replace advice given to you by your health care provider. Make sure you discuss any questions you have with your health care provider.  Document Released: 08/21/2015 Document Revised: 12/19/2018 Document Reviewed: 12/19/2018  [employer] Interactive Patient Education © 2019 [employer]    Orthopedics  Metatarsal Fracture    A metatarsal fracture is a break in one of the five bones that connect the toes to the rest of the foot. This may also be called a forefoot fracture. A metatarsal fracture may be:   A crack in the surface of the bone (stress fracture). This often occurs in athletes.   A break all the way through the bone (complete fracture).  The bone that connects to the little toe (fifth metatarsal) is " most commonly fractured. Ballet dancers often fracture this bone.  What are the causes?  A metatarsal fracture may be caused by:   Sudden twisting of the foot.   Falling onto the foot.   Something heavy falling onto the foot.   Overuse or repetitive exercise.  What increases the risk?  This condition is more likely to develop in people who:   Play contact sports.   Do ballet.   Have a condition that causes the bones to become thin and brittle (osteoporosis).   Have a low calcium level.  What are the signs or symptoms?  Symptoms of this condition include:   Pain that gets worse when walking or standing.   Pain when pressing on the foot or moving the toes.   Swelling.   Bruising on the top or bottom of the foot.  How is this diagnosed?  This condition may be diagnosed based on:   Your symptoms.    Any recent foot injuries you have had.    A physical exam.   An X-ray of your foot. If you have a stress fracture, it may not show up on an X-ray, and you may need other imaging tests, such as:   ? A bone scan.  ?  CT scan.   ? MRI.  How is this treated?  Treatment depends on how severe your fracture is and how the pieces of the broken bone line up with each other (alignment). Treatment may involve:   Wearing a cast, splint, or supportive boot on your foot.   Using crutches, and not putting any weight on your foot.   Having surgery to align broken bones (open reduction and internal fixation, ORIF).   Physical therapy.   Follow-up visits and X-rays to make sure you are healing.  Follow these instructions at home:  If you have a splint or a supportive boot:   Wear the splint or boot as told by your health care provider. Remove it only as told by your health care provider.    Loosen the splint or boot if your toes tingle, become numb, or turn cold and blue.    Keep the splint or boot clean.    If your splint or boot is not waterproof:   ? Do not let it get wet.   ? Cover it with a watertight covering when you take a bath or a  shower.  If you have a cast:   Do not stick anything inside the cast to scratch your skin. Doing that increases your risk for infection.   Check the skin around the cast every day. Tell your health care provider about any concerns.   You may put lotion on dry skin around the edges of the cast. Do not put lotion on the skin underneath the cast.   Keep the cast clean.   If the cast is not waterproof:   ? Do not let it get wet.  ? Cover it with a watertight covering when you take a bath or a shower.  Activity   Do not use your affected leg to support your body weight until your health care provider says that you can. Use crutches as directed.   Ask your health care provider what activities are safe for you during recovery, and ask what activities you need to avoid.   Do physical therapy exercises as directed.  Driving   Do not drive or use heavy machinery while taking pain medicine.   Do not drive while wearing a cast, splint, or boot on a foot that you use for driving.  Managing pain, stiffness, and swelling     If directed, put ice on painful areas:  ? Put ice in a plastic bag.  ? Place a towel between your skin and the bag.  ? If you have a removable splint or boot, remove it as told by your health care provider.  ? If you have a cast, place a towel between your cast and the bag.  ? Leave the ice on for 20 minutes, 2-3 times a day.   Move your toes often to avoid stiffness and to lessen swelling.   Raise (elevate) your lower leg above the level of your heart while you are sitting or lying down.  General instructions   Do not put pressure on any part of the cast or splint until it is fully hardened. This may take several hours.   Take over-the-counter and prescription medicines only as told by your health care provider.   Do not use any products that contain nicotine or tobacco, such as cigarettes and e-cigarettes. These can delay bone healing. If you need help quitting, ask your health care provider.    Do not take  baths, swim, or use a hot tub until your health care provider approves. Ask your health care provider if you may take showers.   Keep all follow-up visits as told by your health care provider. This is important.  Contact a health care provider if you have:   Pain that gets worse or does not get better with medicine.    A fever.   A bad smell coming from your cast or splint.  Get help right away if you have:   Any of the following in your toes or your foot, even after loosening your splint (if applicable):   ? Numbness.   ? Tingling.   ? Coldness.  ?  Blue skin.    Redness or swelling that gets worse.   Pain that suddenly becomes severe.  Summary   A metatarsal fracture is a break in one of the five bones that connect the toes to the rest of the foot.   Treatment depends on how severe your fracture is and how the pieces of the broken bone line up with each other (alignment). This may include wearing a cast, splint, or supportive boot, or using crutches. Sometimes surgery is needed to align the bones.   Ice and elevate your foot to help lessen the pain and swelling.   Make sure you know what symptoms should cause you to get help right away.  This information is not intended to replace advice given to you by your health care provider. Make sure you discuss any questions you have with your health care provider.  Document Released: 09/09/2003 Document Revised: 01/14/2019 Document Reviewed: 01/14/2019  [employer] Interactive Patient Education © 2019 [employer]        Result type: Education Note  Result date: November 15, 2021 10:52 CST  Result status: Auth (Verified)  Result title: Education Note  Performed by: Sirisha Keller LPN on November 15, 2021 10:52 CST  Verified by: Sirisha Keller LPN on November 15, 2021 10:52 CST  Encounter info: [id number], [facility] COMMUNITY [facility] MANAGEMENT, Utilization Coordination, 11/12/2021 -

## 2022-09-16 ENCOUNTER — HISTORICAL (OUTPATIENT)
Dept: ADMINISTRATIVE | Facility: HOSPITAL | Age: 47
End: 2022-09-16
Payer: MEDICAID

## 2022-10-05 ENCOUNTER — PATIENT OUTREACH (OUTPATIENT)
Dept: EMERGENCY MEDICINE | Facility: HOSPITAL | Age: 47
End: 2022-10-05
Payer: MEDICAID

## 2022-10-06 NOTE — PROGRESS NOTES
Called and spoke with patient, says she continues to have pain in her feet. Previously it was just the right foot she had problems with, plantar fasciitis and bone spur.States she is having to taken ibuprofen several times a day to help with the pain. Patient warned of the risk of abdominal issues due to a lot of NSAIDS. Had a Ortho/Sports Medicine consult 06/22/22 and patient was given a prescription to start formal physical therapy and to follow up as needed. She was also instructed to get shoe inserts. States she didn't do any formal PT as ordered but she did get the inserts . Advised to contact Ortho/Sports for a follow up as documented. Stressed the importance of remaining compliant in going to her PCP visits. Recently got re-establish with her PCP after a two years of failed follow ups. Advised to utilized urgent care for any non acute issues.Voices understanding.

## 2022-10-13 ENCOUNTER — HOSPITAL ENCOUNTER (OUTPATIENT)
Dept: RADIOLOGY | Facility: HOSPITAL | Age: 47
Discharge: HOME | End: 2022-10-13
Attending: INTERNAL MEDICINE
Payer: MEDICAID

## 2022-10-13 DIAGNOSIS — Z87.310 H/O HEALED FRAGILITY FRACTURE: ICD-10-CM

## 2022-10-13 DIAGNOSIS — Z00.00 WELLNESS EXAMINATION: ICD-10-CM

## 2022-10-13 PROCEDURE — 77080 DEXA BONE DENSITY SPINE HIP: ICD-10-PCS | Mod: 26,,, | Performed by: RADIOLOGY

## 2022-10-13 PROCEDURE — 77067 SCR MAMMO BI INCL CAD: CPT | Mod: 26,,, | Performed by: RADIOLOGY

## 2022-10-13 PROCEDURE — 77063 BREAST TOMOSYNTHESIS BI: CPT | Mod: 26,,, | Performed by: RADIOLOGY

## 2022-10-13 PROCEDURE — 77080 DXA BONE DENSITY AXIAL: CPT | Mod: TC

## 2022-10-13 PROCEDURE — 77063 MAMMO DIGITAL SCREENING BILAT WITH TOMO: ICD-10-PCS | Mod: 26,,, | Performed by: RADIOLOGY

## 2022-10-13 PROCEDURE — 77080 DXA BONE DENSITY AXIAL: CPT | Mod: 26,,, | Performed by: RADIOLOGY

## 2022-10-13 PROCEDURE — 77067 MAMMO DIGITAL SCREENING BILAT WITH TOMO: ICD-10-PCS | Mod: 26,,, | Performed by: RADIOLOGY

## 2022-10-13 PROCEDURE — 77067 SCR MAMMO BI INCL CAD: CPT | Mod: TC

## 2022-11-28 PROBLEM — U07.1 PNEUMONIA DUE TO COVID-19 VIRUS: Status: RESOLVED | Noted: 2022-08-23 | Resolved: 2022-11-28

## 2022-11-28 PROBLEM — J12.82 PNEUMONIA DUE TO COVID-19 VIRUS: Status: RESOLVED | Noted: 2022-08-23 | Resolved: 2022-11-28

## 2022-11-28 PROBLEM — Z00.00 WELLNESS EXAMINATION: Status: RESOLVED | Noted: 2022-08-23 | Resolved: 2022-11-28

## 2022-12-08 ENCOUNTER — CLINICAL SUPPORT (OUTPATIENT)
Dept: GYNECOLOGY | Facility: CLINIC | Age: 47
End: 2022-12-08
Payer: MEDICAID

## 2022-12-08 ENCOUNTER — PATIENT OUTREACH (OUTPATIENT)
Dept: EMERGENCY MEDICINE | Facility: HOSPITAL | Age: 47
End: 2022-12-08
Payer: MEDICAID

## 2022-12-08 DIAGNOSIS — N93.9 ABNORMAL UTERINE BLEEDING: Primary | ICD-10-CM

## 2022-12-08 LAB
B-HCG UR QL: NEGATIVE
CTP QC/QA: YES

## 2022-12-08 PROCEDURE — 81025 URINE PREGNANCY TEST: CPT | Mod: PBBFAC

## 2022-12-08 PROCEDURE — 99211 OFF/OP EST MAY X REQ PHY/QHP: CPT | Mod: PBBFAC

## 2022-12-08 PROCEDURE — 96372 THER/PROPH/DIAG INJ SC/IM: CPT | Mod: PBBFAC

## 2022-12-08 RX ADMIN — MEDROXYPROGESTERONE ACETATE 150 MG: 150 INJECTION, SUSPENSION, EXTENDED RELEASE INTRAMUSCULAR at 07:12

## 2022-12-08 NOTE — LETTER
December 8, 2022      [facility]  [address] [address]  Phone: [phone]       Patient: Tara Potts   YOB: 1975  Date of Visit: 12/08/2022    To Whom It May Concern:    Dhara Potts  was at [facility] on 12/08/2022. The patient may return to work 12/8/22 with no restrictions. If you have any questions or concerns, or if I can be of further assistance, please do not hesitate to contact me.    Sincerely,    Ann-Marie Elizabeth RN

## 2022-12-13 ENCOUNTER — OFFICE VISIT (OUTPATIENT)
Dept: INTERNAL MEDICINE | Facility: CLINIC | Age: 47
End: 2022-12-13
Payer: MEDICAID

## 2022-12-13 VITALS
SYSTOLIC BLOOD PRESSURE: 140 MMHG | BODY MASS INDEX: 43.39 KG/M2 | HEIGHT: 66 IN | OXYGEN SATURATION: 100 % | TEMPERATURE: 99 F | DIASTOLIC BLOOD PRESSURE: 86 MMHG | HEART RATE: 72 BPM | WEIGHT: 270 LBS | RESPIRATION RATE: 18 BRPM

## 2022-12-13 DIAGNOSIS — M72.2 PLANTAR FASCIITIS: ICD-10-CM

## 2022-12-13 DIAGNOSIS — M85.80 OSTEOPENIA, UNSPECIFIED LOCATION: ICD-10-CM

## 2022-12-13 DIAGNOSIS — I10 PRIMARY HYPERTENSION: Primary | ICD-10-CM

## 2022-12-13 DIAGNOSIS — S92.901A CLOSED FRACTURE OF RIGHT FOOT, INITIAL ENCOUNTER: ICD-10-CM

## 2022-12-13 DIAGNOSIS — E61.1 IRON DEFICIENCY: ICD-10-CM

## 2022-12-13 DIAGNOSIS — D50.0 IRON DEFICIENCY ANEMIA DUE TO CHRONIC BLOOD LOSS: ICD-10-CM

## 2022-12-13 DIAGNOSIS — E66.01 CLASS 2 SEVERE OBESITY DUE TO EXCESS CALORIES WITH SERIOUS COMORBIDITY IN ADULT, UNSPECIFIED BMI: ICD-10-CM

## 2022-12-13 DIAGNOSIS — E66.01 MORBID OBESITY: ICD-10-CM

## 2022-12-13 DIAGNOSIS — E55.9 VITAMIN D DEFICIENCY: ICD-10-CM

## 2022-12-13 DIAGNOSIS — N93.8 DYSFUNCTIONAL UTERINE BLEEDING: ICD-10-CM

## 2022-12-13 PROCEDURE — 99213 OFFICE O/P EST LOW 20 MIN: CPT | Mod: PBBFAC | Performed by: INTERNAL MEDICINE

## 2022-12-13 RX ORDER — AMLODIPINE BESYLATE 10 MG/1
10 TABLET ORAL DAILY
Qty: 30 TABLET | Refills: 11 | Status: SHIPPED | OUTPATIENT
Start: 2022-12-13 | End: 2022-12-13

## 2022-12-13 RX ORDER — AMLODIPINE BESYLATE 10 MG/1
10 TABLET ORAL DAILY
Qty: 30 TABLET | Refills: 11 | Status: SHIPPED | OUTPATIENT
Start: 2022-12-13 | End: 2023-10-24 | Stop reason: SDUPTHER

## 2022-12-13 NOTE — PROGRESS NOTES
Patient is here for follow-up bone density test was mild osteopenia because of her young age I would rather not start a bisphosphonate yet we will make sure she is taking 2 Tums daily for calcium and check her vitamin-D level she said she was not taking vitamin-D but recently started a few weeks ago at 5000 units b.i.d. we have asked her to take 5000 units daily check on the level and then let us know what she is taking and coordinate closely orthopedist said she has plantar fasciitis and heel spurs and managing this for her hypertension is such that we are going to add amlodipine 10 mg daily is 140/86 but we would like better control her obesity is stable she did have iron deficiency and vitamin-D deficiency which checking those levels abnormal uterine bleeding is being followed by gyn she had a colonoscopy February 6, 2020 repeat was suggested in 5 years although it was normal and I could see that it would be recommended for 10 years she is seen a dentist saw an ophthalmologist in 2022 carpal tunnel is stable as is furunculosis we will check a vitamin D BMP iron TIBC ferritin otherwise as above time equal 42 minutes Subjective:       Patient ID: Tara Potts is a 47 y.o. female.    Chief Complaint: Follow-up    Follow-up    Review of Systems   All other systems reviewed and are negative.      Objective:      Physical Exam  Vitals and nursing note reviewed.   Constitutional:       Appearance: Normal appearance. She is obese.   HENT:      Head: Normocephalic and atraumatic.      Right Ear: Tympanic membrane, ear canal and external ear normal.      Left Ear: Tympanic membrane, ear canal and external ear normal.      Nose: Nose normal.      Mouth/Throat:      Mouth: Mucous membranes are dry.      Pharynx: Oropharynx is clear.   Eyes:      Extraocular Movements: Extraocular movements intact.      Conjunctiva/sclera: Conjunctivae normal.      Pupils: Pupils are equal, round, and reactive to light.   Cardiovascular:       Rate and Rhythm: Normal rate and regular rhythm.      Pulses: Normal pulses.      Heart sounds: Normal heart sounds.   Pulmonary:      Effort: Pulmonary effort is normal.      Breath sounds: Normal breath sounds.   Abdominal:      General: Bowel sounds are normal.      Palpations: Abdomen is soft.   Musculoskeletal:      Cervical back: Normal range of motion and neck supple.   Skin:     General: Skin is warm and dry.   Neurological:      General: No focal deficit present.      Mental Status: She is alert and oriented to person, place, and time. Mental status is at baseline.   Psychiatric:         Mood and Affect: Mood normal.         Behavior: Behavior normal.         Thought Content: Thought content normal.         Judgment: Judgment normal.       Assessment:       Problem List Items Addressed This Visit          Cardiac/Vascular    Hypertension - Primary    Relevant Orders    Basic Metabolic Panel (Completed)       Renal/    Dysfunctional uterine bleeding       Oncology    Anemia    Relevant Orders    Iron and TIBC (Completed)    Iron deficiency       Endocrine    Obesity    Morbid obesity    Vitamin D deficiency    Relevant Orders    Vitamin D (Completed)       Orthopedic    Plantar fasciitis    Foot fracture    Osteopenia         Plan:       See above

## 2023-03-09 ENCOUNTER — CLINICAL SUPPORT (OUTPATIENT)
Dept: GYNECOLOGY | Facility: CLINIC | Age: 48
End: 2023-03-09
Payer: MEDICAID

## 2023-03-09 PROCEDURE — 96372 THER/PROPH/DIAG INJ SC/IM: CPT | Mod: PBBFAC

## 2023-03-09 RX ADMIN — MEDROXYPROGESTERONE ACETATE 150 MG: 150 INJECTION, SUSPENSION, EXTENDED RELEASE INTRAMUSCULAR at 02:03

## 2023-03-09 NOTE — PROGRESS NOTES
Depo injection given IM to Right Dorsalgluteal per md orders. Patient tolerated well. Discharge instruction given. Appointment for next depo made and given.

## 2023-04-11 ENCOUNTER — PATIENT MESSAGE (OUTPATIENT)
Dept: RESEARCH | Facility: HOSPITAL | Age: 48
End: 2023-04-11
Payer: MEDICAID

## 2023-04-12 ENCOUNTER — DOCUMENTATION ONLY (OUTPATIENT)
Dept: ADMINISTRATIVE | Facility: HOSPITAL | Age: 48
End: 2023-04-12
Payer: MEDICAID

## 2023-06-13 ENCOUNTER — CLINICAL SUPPORT (OUTPATIENT)
Dept: GYNECOLOGY | Facility: CLINIC | Age: 48
End: 2023-06-13
Payer: MEDICAID

## 2023-06-13 PROCEDURE — 96372 THER/PROPH/DIAG INJ SC/IM: CPT | Mod: PBBFAC

## 2023-06-13 RX ADMIN — MEDROXYPROGESTERONE ACETATE 150 MG: 150 INJECTION, SUSPENSION, EXTENDED RELEASE INTRAMUSCULAR at 02:06

## 2023-06-13 NOTE — PROGRESS NOTES
Depo provera given in left hip, tolerated well.   Reminded of annual in August with Katie and rescheduling out a year, needs depo refilled.

## 2023-08-18 ENCOUNTER — OFFICE VISIT (OUTPATIENT)
Dept: GYNECOLOGY | Facility: CLINIC | Age: 48
End: 2023-08-18
Payer: MEDICAID

## 2023-08-18 VITALS
BODY MASS INDEX: 43.93 KG/M2 | HEART RATE: 75 BPM | WEIGHT: 273.38 LBS | DIASTOLIC BLOOD PRESSURE: 88 MMHG | HEIGHT: 66 IN | SYSTOLIC BLOOD PRESSURE: 126 MMHG | TEMPERATURE: 99 F | RESPIRATION RATE: 20 BRPM | OXYGEN SATURATION: 100 %

## 2023-08-18 DIAGNOSIS — N92.0 MENORRHAGIA WITH REGULAR CYCLE: ICD-10-CM

## 2023-08-18 DIAGNOSIS — Z01.419 ENCOUNTER FOR ANNUAL ROUTINE GYNECOLOGICAL EXAMINATION: Primary | ICD-10-CM

## 2023-08-18 DIAGNOSIS — Z12.31 VISIT FOR SCREENING MAMMOGRAM: ICD-10-CM

## 2023-08-18 PROCEDURE — 3074F PR MOST RECENT SYSTOLIC BLOOD PRESSURE < 130 MM HG: ICD-10-PCS | Mod: CPTII,,, | Performed by: NURSE PRACTITIONER

## 2023-08-18 PROCEDURE — 99214 OFFICE O/P EST MOD 30 MIN: CPT | Mod: PBBFAC | Performed by: NURSE PRACTITIONER

## 2023-08-18 PROCEDURE — 3079F DIAST BP 80-89 MM HG: CPT | Mod: CPTII,,, | Performed by: NURSE PRACTITIONER

## 2023-08-18 PROCEDURE — 3008F PR BODY MASS INDEX (BMI) DOCUMENTED: ICD-10-PCS | Mod: CPTII,,, | Performed by: NURSE PRACTITIONER

## 2023-08-18 PROCEDURE — 1159F PR MEDICATION LIST DOCUMENTED IN MEDICAL RECORD: ICD-10-PCS | Mod: CPTII,,, | Performed by: NURSE PRACTITIONER

## 2023-08-18 PROCEDURE — 3074F SYST BP LT 130 MM HG: CPT | Mod: CPTII,,, | Performed by: NURSE PRACTITIONER

## 2023-08-18 PROCEDURE — 99396 PR PREVENTIVE VISIT,EST,40-64: ICD-10-PCS | Mod: S$PBB,,, | Performed by: NURSE PRACTITIONER

## 2023-08-18 PROCEDURE — 99396 PREV VISIT EST AGE 40-64: CPT | Mod: S$PBB,,, | Performed by: NURSE PRACTITIONER

## 2023-08-18 PROCEDURE — 1159F MED LIST DOCD IN RCRD: CPT | Mod: CPTII,,, | Performed by: NURSE PRACTITIONER

## 2023-08-18 PROCEDURE — 3079F PR MOST RECENT DIASTOLIC BLOOD PRESSURE 80-89 MM HG: ICD-10-PCS | Mod: CPTII,,, | Performed by: NURSE PRACTITIONER

## 2023-08-18 PROCEDURE — 3008F BODY MASS INDEX DOCD: CPT | Mod: CPTII,,, | Performed by: NURSE PRACTITIONER

## 2023-08-18 RX ORDER — MEDROXYPROGESTERONE ACETATE 150 MG/ML
150 INJECTION, SUSPENSION INTRAMUSCULAR
Status: ACTIVE | OUTPATIENT
Start: 2023-09-14 | End: 2024-12-06

## 2023-08-18 RX ORDER — LORATADINE 10 MG/1
10 TABLET ORAL DAILY PRN
COMMUNITY
Start: 2023-03-07

## 2023-08-18 NOTE — LETTER
August 18, 2023      [facility]  [address] [address]  Phone: [phone]       Patient: Tara Potts   YOB: 1975  Date of Visit: 08/18/2023    To Whom It May Concern:    Dhara Potts  was at [facility] on 08/18/2023. The patient may return to work/school on 08/18/2023 with no restrictions. If you have any questions or concerns, or if I can be of further assistance, please do not hesitate to contact me.    Sincerely,    Eyad Wilson NP

## 2023-08-18 NOTE — PROGRESS NOTES
"  Subjective:       Patient ID: Tara Potts is a 47 y.o. female.    Chief Complaint:  Annual Exam    History of Present Illness  Pt is  here for annual gyn exam. Hx of mild dysplasia 18 yrs ago with cryotherapy. All paps since negative. Last pap 3/2021-NIL; HPV negative. Pt with hx of TL for contraception. Currently on depo provera for AUB-L since off and on since . Pt is amenorrheic and wishes to continue Depo. Hx of transfusion in the past prior to Depo. Denies any vaginal bleeding or spotting. Denies vaginal discharge, itching and declines any STI screening today, no sexually active. Hx of renal stent in the past secondary to chronic UTIs. MG-10/13/22-BIRADS 1. Denies breast complaints. Denies tobacco use. HPV vaccine completed. Denies fly hx of breast, ovarian or uterine cancer. Admits to [facility] and Mat uncle with colon cancer. Breast cancer in paternal aunt. Osteopenia on DEXA in .    GYN & OB History  No LMP recorded. (Menstrual status: Birth Control).   Date of Last Pap: 8/10/2021    Review of patient's allergies indicates:   Allergen Reactions    Sulfa (sulfonamide antibiotics) Hives, Itching, Nausea Only, Palpitations, Rash and Shortness Of Breath     Past Medical History:   Diagnosis Date    Abnormal Pap smear of cervix     Breast disorder     Hypertension     Osteopenia      OB History    Para Term  AB Living   4 4           SAB IAB Ectopic Multiple Live Births                  # Outcome Date GA Lbr Tony/2nd Weight Sex Delivery Anes PTL Lv   4 Para            3 Para            2 Para            1 Para                 Review of Systems  Review of Systems    Negative except for pertinent findings for positives per HPI     Objective:    Physical Exam    /88 (BP Location: Right arm, Patient Position: Sitting, BP Method: Large (Automatic))   Pulse 75   Temp 98.5 °F (36.9 °C) (Oral)   Resp 20   Ht 5' 6" (1.676 m)   Wt 124 kg (273 lb 6.4 oz)   SpO2 100%   BMI 44.13 kg/m² "   GENERAL: Well-developed female in no acute distress.  SKIN: Normal to inspection, warm and intact.  BREASTS: No masses, lumps, discharge, tenderness.  VULVA: General appearance normal; external genitalia with no lesions or erythema.  VAGINA: Mucosa normal, pink, no abnormal discharge or lesions.  CERVIX: Grossly normal, pink, no erythema or abnormal discharge.  BIMANUAL EXAM: reveals a 12 week-sized uterus. The uterus is non tender. Nicolas adnexa reveal no tenderness.  PSYCHIATRIC: Patient is oriented to person, place, and time. Mood and affect are normal.    Assessment:       1. Encounter for annual routine gynecological examination    2. Visit for screening mammogram    3. Menorrhagia with regular cycle         Plan:   Tara was seen today for annual exam.    Diagnoses and all orders for this visit:    Encounter for annual routine gynecological examination    Visit for screening mammogram  -     Mammo Digital Screening Bilat w/ Ricardo; Future    Menorrhagia with regular cycle  -     medroxyPROGESTERone (DEPO-PROVERA) injection 150 mg    Pelvic today, pap utd per ACOG  MG ordered    Osteopenia-takes calcium and vitamin D,  Discussed options for medical management of AUB with pt. Depo, Provera and Mirena IUD. Depo-pt informed that will likely control bleeding either with amenorrhea or lighten cycles, potential weight gain. Mirena IUD also likely best option for medical management. Limited systemic absorption, possible amenorrhea or lighter cycles and coverage for 5 years. Provera-pt informed that will likely control bleeding either with amenorrhea or lighten cycles. Pt reluctant to change from Depo to Provera as she is concerned with AUB resuming and not interested in surgical management at this time. Understands risk of osteopenia/osteoporosis with Depo use.    Continue Depo  Discussed long term use of Depo can cause bone weakening. Diet high in calcium and vit. D. Pt instructed to increase dietary calcium and vit. D  intake. Encouraged dietary calcium rich foods like broccoli, mustard, turnip greens, oranges, dairy products, sardines, salmon, and almonds. Encouraged Vit. D absorption with 30 mins in sun 5 days/week and fatty fish. Increase muscle strengthening and aerobic exercise (walking, climbing, bicycle) at least 30 mins for 3 times/week. Handouts provided.  Discussed risk and benefits of Depo to include weight gain and amenorrhea. Discussed delayed return of ovulation and cycles with Depo, encouraged to stop Depo at least 1 year prior to decision to conceive. Also discussed it can take up to 3-4 shots to regulate cycles with Depo during this time may experience irregular or prolonged bleeding. Pt verbalized understanding.   Follow up in about 1 year (around 8/18/2024) for annual exam.

## 2023-09-14 ENCOUNTER — CLINICAL SUPPORT (OUTPATIENT)
Dept: GYNECOLOGY | Facility: CLINIC | Age: 48
End: 2023-09-14
Payer: MEDICAID

## 2023-09-14 DIAGNOSIS — Z30.9 ENCOUNTER FOR CONTRACEPTIVE MANAGEMENT, UNSPECIFIED TYPE: Primary | ICD-10-CM

## 2023-09-14 PROCEDURE — 96372 THER/PROPH/DIAG INJ SC/IM: CPT | Mod: PBBFAC

## 2023-09-14 RX ADMIN — MEDROXYPROGESTERONE ACETATE 150 MG: 150 INJECTION, SUSPENSION INTRAMUSCULAR at 10:09

## 2023-10-16 NOTE — PROGRESS NOTES
[facility] INTERNAL MEDICINE  OUTPATIENT OFFICE VISIT NOTE    SUBJECTIVE:      HPI: follow up  PCP Dr Connolly  Last appt- no show on 5/9/23    PMHx  HTNOsteopenia  AUB- followed by GYN  Hx of Anemia- resolved  Obesity  Vitamin D deficiency      Reviewed lab work from 12/22  Low iron saturation, low iron  Vitamin D 27.6  Mild hyperglycemia    Meds  Amlodipine 10 mg daily  HCTZ 12.5 daily  Claritin        ROS:  CONSTITUTIONAL: Denies weight loss, fever and chills.  HEENT: Denies changes in vision and hearing.  ?RESPIRATORY: Denies SOB and cough.?  CV: Denies palpitations and CP. ?  GI: Denies abdominal pain, nausea, vomiting and diarrhea., has occasional acid reflux- doesn't use any medications  : Denies dysuria and urinary frequency.?  MSK: Denies myalgia and joint pain.?  SKIN: Denies rash and pruritus.  ?NEUROLOGICAL: Denies headache and syncope.?  PSYCHIATRIC: Denies recent changes in mood. Denies anxiety and depression.     OBJECTIVE:     Physical Examination:    Vital signs:     Vitals:    10/24/23 1446   BP: 110/77   Pulse: 75   Resp: 20   Temp: 98.2 °F (36.8 °C)        General: Well nourished w/o distress  HEENT: from of neck  Neck: Full ROM; no lymphadenopathy  Pulm: CTA bilaterally, normal work of breathing  CV: S1, S2 w/o murmurs or gallops; no edema noted  GI: Soft non tender  MSK: Full ROM of all extremities and spine w/o limitation or discomfort  Derm: No rashes, abnormal bruising, or skin lesions    Psych: Cooperative; appropriate mood and affect             ASSESSMENT & PLAN:   HTN- at goal on amlodipine, holding hctz which patient hasn't been taking  Plantar fascitis- Neurontin q hs has helped in the past, refilled  Osteopenia- on Vitamin D/calcium supplementation  Obesity- life style modifications discussed  Vitamin D deficiency- on replacement          Follow up in about 3 months (around 1/24/2024).     Lesly Kemp MD

## 2023-10-24 ENCOUNTER — OFFICE VISIT (OUTPATIENT)
Dept: INTERNAL MEDICINE | Facility: CLINIC | Age: 48
End: 2023-10-24
Payer: MEDICAID

## 2023-10-24 VITALS
WEIGHT: 274 LBS | TEMPERATURE: 98 F | HEIGHT: 66 IN | OXYGEN SATURATION: 100 % | SYSTOLIC BLOOD PRESSURE: 110 MMHG | BODY MASS INDEX: 44.03 KG/M2 | HEART RATE: 75 BPM | RESPIRATION RATE: 20 BRPM | DIASTOLIC BLOOD PRESSURE: 77 MMHG

## 2023-10-24 DIAGNOSIS — M72.2 PLANTAR FASCIITIS: ICD-10-CM

## 2023-10-24 DIAGNOSIS — I10 PRIMARY HYPERTENSION: Primary | ICD-10-CM

## 2023-10-24 PROCEDURE — 99213 OFFICE O/P EST LOW 20 MIN: CPT | Mod: PBBFAC | Performed by: INTERNAL MEDICINE

## 2023-10-24 RX ORDER — GABAPENTIN 300 MG/1
300 CAPSULE ORAL NIGHTLY
Qty: 30 CAPSULE | Refills: 11 | Status: SHIPPED | OUTPATIENT
Start: 2023-10-24 | End: 2024-10-23

## 2023-10-24 RX ORDER — AMLODIPINE BESYLATE 10 MG/1
10 TABLET ORAL DAILY
Qty: 30 TABLET | Refills: 11 | Status: SHIPPED | OUTPATIENT
Start: 2023-10-24 | End: 2024-10-23

## 2023-11-15 ENCOUNTER — HOSPITAL ENCOUNTER (OUTPATIENT)
Dept: RADIOLOGY | Facility: HOSPITAL | Age: 48
Discharge: HOME | End: 2023-11-15
Attending: NURSE PRACTITIONER
Payer: MEDICAID

## 2023-11-15 DIAGNOSIS — Z12.31 VISIT FOR SCREENING MAMMOGRAM: ICD-10-CM

## 2023-11-15 PROCEDURE — 77063 BREAST TOMOSYNTHESIS BI: CPT | Mod: 26,,, | Performed by: RADIOLOGY

## 2023-11-15 PROCEDURE — 77067 SCR MAMMO BI INCL CAD: CPT | Mod: 26,,, | Performed by: RADIOLOGY

## 2023-11-15 PROCEDURE — 77067 SCR MAMMO BI INCL CAD: CPT | Mod: TC

## 2023-11-15 PROCEDURE — 77067 MAMMO DIGITAL SCREENING BILAT WITH TOMO: ICD-10-PCS | Mod: 26,,, | Performed by: RADIOLOGY

## 2023-11-15 PROCEDURE — 77063 MAMMO DIGITAL SCREENING BILAT WITH TOMO: ICD-10-PCS | Mod: 26,,, | Performed by: RADIOLOGY

## 2023-12-14 ENCOUNTER — CLINICAL SUPPORT (OUTPATIENT)
Dept: GYNECOLOGY | Facility: CLINIC | Age: 48
End: 2023-12-14
Payer: MEDICAID

## 2023-12-14 DIAGNOSIS — N93.9 ABNORMAL UTERINE BLEEDING: Primary | ICD-10-CM

## 2023-12-14 PROCEDURE — 96372 THER/PROPH/DIAG INJ SC/IM: CPT | Mod: PBBFAC

## 2023-12-14 RX ADMIN — MEDROXYPROGESTERONE ACETATE 150 MG: 150 INJECTION, SUSPENSION INTRAMUSCULAR at 08:12

## 2023-12-14 NOTE — LETTER
December 14, 2023      [facility]  [address] [address]  Phone: [phone]       Patient: Tara Potts   YOB: 1975  Date of Visit: 12/14/2023    To Whom It May Concern:    Dhara Potts  was at [facility] on 12/14/2023. The patient may return to work on 12/14/23 with no restrictions. If you have any questions or concerns, or if I can be of further assistance, please do not hesitate to contact me.    Sincerely,    Ann-Marie Elizabeth RN

## 2024-03-14 ENCOUNTER — CLINICAL SUPPORT (OUTPATIENT)
Dept: GYNECOLOGY | Facility: CLINIC | Age: 49
End: 2024-03-14

## 2024-03-14 DIAGNOSIS — N92.0 MENORRHAGIA WITH REGULAR CYCLE: Primary | ICD-10-CM

## 2024-03-14 PROCEDURE — 99211 OFF/OP EST MAY X REQ PHY/QHP: CPT | Mod: PBBFAC,25

## 2024-03-14 PROCEDURE — 96372 THER/PROPH/DIAG INJ SC/IM: CPT | Mod: PBBFAC

## 2024-03-14 RX ADMIN — MEDROXYPROGESTERONE ACETATE 150 MG: 150 INJECTION, SUSPENSION INTRAMUSCULAR at 08:03

## 2024-03-14 NOTE — PROGRESS NOTES
Depo-Provera injection administered per Katie Castano NP order. Patient tolerated well and left in stable condition.

## 2024-05-14 ENCOUNTER — OFFICE VISIT (OUTPATIENT)
Dept: INTERNAL MEDICINE | Facility: CLINIC | Age: 49
End: 2024-05-14

## 2024-05-14 VITALS
RESPIRATION RATE: 18 BRPM | TEMPERATURE: 98 F | WEIGHT: 269.81 LBS | DIASTOLIC BLOOD PRESSURE: 82 MMHG | SYSTOLIC BLOOD PRESSURE: 119 MMHG | BODY MASS INDEX: 43.36 KG/M2 | HEIGHT: 66 IN | OXYGEN SATURATION: 100 % | HEART RATE: 89 BPM

## 2024-05-14 DIAGNOSIS — I10 PRIMARY HYPERTENSION: ICD-10-CM

## 2024-05-14 DIAGNOSIS — D50.0 IRON DEFICIENCY ANEMIA DUE TO CHRONIC BLOOD LOSS: ICD-10-CM

## 2024-05-14 DIAGNOSIS — E55.9 VITAMIN D DEFICIENCY: ICD-10-CM

## 2024-05-14 DIAGNOSIS — E61.1 IRON DEFICIENCY: ICD-10-CM

## 2024-05-14 DIAGNOSIS — M85.859 OSTEOPENIA OF HIP, UNSPECIFIED LATERALITY: ICD-10-CM

## 2024-05-14 DIAGNOSIS — N93.8 DYSFUNCTIONAL UTERINE BLEEDING: ICD-10-CM

## 2024-05-14 DIAGNOSIS — S92.909D: ICD-10-CM

## 2024-05-14 DIAGNOSIS — M79.7 FIBROMYALGIA: Primary | ICD-10-CM

## 2024-05-14 DIAGNOSIS — M72.2 PLANTAR FASCIITIS: ICD-10-CM

## 2024-05-14 DIAGNOSIS — E66.01 CLASS 2 SEVERE OBESITY DUE TO EXCESS CALORIES WITH SERIOUS COMORBIDITY IN ADULT, UNSPECIFIED BMI: ICD-10-CM

## 2024-05-14 PROBLEM — G89.29 CHRONIC PAIN OF LEFT KNEE: Status: ACTIVE | Noted: 2024-05-14

## 2024-05-14 PROBLEM — R03.0 PREHYPERTENSION: Status: ACTIVE | Noted: 2024-05-14

## 2024-05-14 PROBLEM — R20.0 NUMBNESS OF FACE: Status: ACTIVE | Noted: 2024-05-14

## 2024-05-14 PROBLEM — M25.562 CHRONIC PAIN OF LEFT KNEE: Status: ACTIVE | Noted: 2024-05-14

## 2024-05-14 PROBLEM — I25.3 ATRIAL SEPTAL ANEURYSM: Status: ACTIVE | Noted: 2024-05-14

## 2024-05-14 PROBLEM — N92.0 MENORRHAGIA: Status: ACTIVE | Noted: 2024-05-14

## 2024-05-14 PROBLEM — F41.8 MIXED ANXIETY DEPRESSIVE DISORDER: Status: ACTIVE | Noted: 2023-05-16

## 2024-05-14 PROBLEM — G56.00 CARPAL TUNNEL SYNDROME: Status: ACTIVE | Noted: 2024-05-14

## 2024-05-14 PROBLEM — I34.1 MITRAL VALVE PROLAPSE: Status: ACTIVE | Noted: 2023-05-16

## 2024-05-14 PROBLEM — K21.9 GASTROESOPHAGEAL REFLUX DISEASE: Status: ACTIVE | Noted: 2023-05-16

## 2024-05-14 PROCEDURE — 99214 OFFICE O/P EST MOD 30 MIN: CPT | Mod: PBBFAC | Performed by: INTERNAL MEDICINE

## 2024-05-14 RX ORDER — GABAPENTIN 100 MG/1
200 CAPSULE ORAL 2 TIMES DAILY
Qty: 120 CAPSULE | Refills: 11 | Status: SHIPPED | OUTPATIENT
Start: 2024-05-14 | End: 2024-05-17 | Stop reason: SDUPTHER

## 2024-05-14 RX ORDER — IBUPROFEN 600 MG/1
600 TABLET ORAL EVERY 8 HOURS PRN
Qty: 270 TABLET | Refills: 0 | Status: SHIPPED | OUTPATIENT
Start: 2024-05-14 | End: 2024-05-17 | Stop reason: SDUPTHER

## 2024-05-14 NOTE — PROGRESS NOTES
Subjective     Patient ID: Tara Potts is a 48 y.o. female.    Chief Complaint: Follow-up    Follow-up      Time spent with this patient is 60 minutes she is documented osteopenia as of October of 2022 we will need a repeat bone density test in October 2024 she is abnormal uterine bleeding for which she is being followed by gyn in taking Depo-Provera every 3 months this is controlling her bleeding they have does not discuss surgery iron-deficiency anemia is chronic because of her insurance situation at this time she would not like labs today she will see however the  to look at getting financial assistance as is appropriate for her financial situation hopefully we can get this arranged for when we see her back in 2 months obesity is stable vitamin-D will be checked on return we spent extensive time talking about her plantar fasciitis which she has had for years she is wearing very flexible soft soled shoes which I think are not appropriate for her condition we are going to send her to try running to get the appropriate type shoes I sent her a paper explaining to them that she needs something was stiff soles inappropriate arches for this condition they are very good at fitting people for this she has fibromyalgia according to her condition she is right hip pain it sounds like it is more intrinsic to her hip I do think we need x-rays but we will have to see about her financial situation before we can get x-rays she does have some anxiety and depression because of her chronic pain we are going to start her on Savella JAMIL SAVANA if this does not help a we might move on towards low-dose tramadol her gabapentin is only once a day 300 mg because less physician who saw her was worried about the dosing 300 mg does make her sleepy at times we are going to change to 200 mg b.i.d. she was using 2 different NSAIDs at the same time that was corrected when she made a visit to the emergency room we will give her extra  strength Tylenol plus ibuprofen 600 mg t.i.d. p.r.n. on return we will check iron TIBC ferritin TSH PTH vitamin-D CBC CMP sed rate SUHA rheumatoid factor UA CBC etc. we might consider PT OT if she can get financial support she is to work on her posture besides correct shoes we talked about gentle exercise especially with the new step versus cycle or walking her job requires that she walks about 2500 steps a day or more she seems so 1 encouraged she is 4 children her 3 sons all have drug problems her daughter is the manager or in a management position at [employer] problems include osteopenia abnormal uterine bleeding iron-deficiency anemia obesity vitamin-D deficiency plantar fasciitis fibromyalgia depression  Review of Systems   All other systems reviewed and are negative.         Objective     Physical Exam  Vitals and nursing note reviewed.   Constitutional:       Appearance: Normal appearance. She is obese.   HENT:      Head: Normocephalic and atraumatic.      Right Ear: Tympanic membrane, ear canal and external ear normal.      Left Ear: Tympanic membrane, ear canal and external ear normal.      Nose: Nose normal.      Mouth/Throat:      Mouth: Mucous membranes are dry.      Pharynx: Oropharynx is clear.   Eyes:      Extraocular Movements: Extraocular movements intact.      Conjunctiva/sclera: Conjunctivae normal.      Pupils: Pupils are equal, round, and reactive to light.   Cardiovascular:      Rate and Rhythm: Normal rate and regular rhythm.      Pulses: Normal pulses.      Heart sounds: Normal heart sounds.   Pulmonary:      Effort: Pulmonary effort is normal.      Breath sounds: Normal breath sounds.   Abdominal:      General: Bowel sounds are normal.      Palpations: Abdomen is soft.   Musculoskeletal:      Cervical back: Normal range of motion and neck supple.   Skin:     General: Skin is warm and dry.   Neurological:      General: No focal deficit present.      Mental Status: She is alert and oriented to  person, place, and time. Mental status is at baseline.   Psychiatric:         Mood and Affect: Mood normal.         Behavior: Behavior normal.         Thought Content: Thought content normal.         Judgment: Judgment normal.            Assessment and Plan     1. Fibromyalgia  -     milnacipran (SAVELLA) 12.5 mg Tab tablet; Take 4 tablets (50 mg total) by mouth 2 (two) times daily, THEN 4 tablets (50 mg total) 2 (two) times daily.  Dispense: 180 tablet; Refill: 0  -     gabapentin (NEURONTIN) 100 MG capsule; Take 2 capsules (200 mg total) by mouth 2 (two) times daily.  Dispense: 120 capsule; Refill: 11  -     ibuprofen (ADVIL,MOTRIN) 600 MG tablet; Take 1 tablet (600 mg total) by mouth every 8 (eight) hours as needed for Pain (with food).  Dispense: 270 tablet; Refill: 0  -     Ambulatory referral/consult to Social Work; Future; Expected date: 05/21/2024    2. Primary hypertension    3. Dysfunctional uterine bleeding    4. Iron deficiency anemia due to chronic blood loss    5. Iron deficiency    6. Class 2 severe obesity due to excess calories with serious comorbidity in adult, unspecified BMI    7. Vitamin D deficiency    8. Plantar fasciitis    9. Open fracture of foot with routine healing, unspecified laterality, subsequent encounter    10. Osteopenia of hip, unspecified laterality        All problems listed above have been reviewed with the patient time equal 50 minutes         Follow up in about 2 months (around 7/14/2024).

## 2024-05-17 DIAGNOSIS — M79.7 FIBROMYALGIA: ICD-10-CM

## 2024-05-17 RX ORDER — GABAPENTIN 100 MG/1
200 CAPSULE ORAL 2 TIMES DAILY
Qty: 120 CAPSULE | Refills: 11 | Status: SHIPPED | OUTPATIENT
Start: 2024-05-17 | End: 2025-05-17

## 2024-05-17 RX ORDER — IBUPROFEN 600 MG/1
600 TABLET ORAL EVERY 8 HOURS PRN
Qty: 270 TABLET | Refills: 0 | Status: SHIPPED | OUTPATIENT
Start: 2024-05-17 | End: 2024-08-15

## 2024-06-20 ENCOUNTER — CLINICAL SUPPORT (OUTPATIENT)
Dept: GYNECOLOGY | Facility: CLINIC | Age: 49
End: 2024-06-20

## 2024-06-20 DIAGNOSIS — N92.0 MENORRHAGIA WITH REGULAR CYCLE: Primary | ICD-10-CM

## 2024-06-20 PROCEDURE — 99211 OFF/OP EST MAY X REQ PHY/QHP: CPT | Mod: PBBFAC

## 2024-06-20 RX ORDER — MEDROXYPROGESTERONE ACETATE 150 MG/ML
150 INJECTION, SUSPENSION INTRAMUSCULAR
Status: COMPLETED | OUTPATIENT
Start: 2024-06-20 | End: 2024-06-20

## 2024-06-20 RX ADMIN — MEDROXYPROGESTERONE ACETATE 150 MG: 150 INJECTION, SUSPENSION INTRAMUSCULAR at 08:06

## 2024-07-16 ENCOUNTER — HOSPITAL ENCOUNTER (OUTPATIENT)
Dept: RADIOLOGY | Facility: HOSPITAL | Age: 49
Discharge: HOME | End: 2024-07-16
Attending: INTERNAL MEDICINE

## 2024-07-16 ENCOUNTER — OFFICE VISIT (OUTPATIENT)
Dept: INTERNAL MEDICINE | Facility: CLINIC | Age: 49
End: 2024-07-16

## 2024-07-16 VITALS
HEIGHT: 66 IN | HEART RATE: 75 BPM | BODY MASS INDEX: 42.65 KG/M2 | TEMPERATURE: 98 F | OXYGEN SATURATION: 100 % | DIASTOLIC BLOOD PRESSURE: 79 MMHG | SYSTOLIC BLOOD PRESSURE: 120 MMHG | RESPIRATION RATE: 18 BRPM | WEIGHT: 265.38 LBS

## 2024-07-16 DIAGNOSIS — M85.80 OSTEOPENIA, UNSPECIFIED LOCATION: ICD-10-CM

## 2024-07-16 DIAGNOSIS — E55.9 VITAMIN D DEFICIENCY: ICD-10-CM

## 2024-07-16 DIAGNOSIS — D50.0 IRON DEFICIENCY ANEMIA DUE TO CHRONIC BLOOD LOSS: ICD-10-CM

## 2024-07-16 DIAGNOSIS — F41.8 MIXED ANXIETY DEPRESSIVE DISORDER: ICD-10-CM

## 2024-07-16 DIAGNOSIS — M79.671 FOOT PAIN, BILATERAL: ICD-10-CM

## 2024-07-16 DIAGNOSIS — N93.8 DYSFUNCTIONAL UTERINE BLEEDING: ICD-10-CM

## 2024-07-16 DIAGNOSIS — I25.3 ATRIAL SEPTAL ANEURYSM: ICD-10-CM

## 2024-07-16 DIAGNOSIS — K21.00 GASTROESOPHAGEAL REFLUX DISEASE WITH ESOPHAGITIS WITHOUT HEMORRHAGE: ICD-10-CM

## 2024-07-16 DIAGNOSIS — S92.901D OPEN FRACTURE OF RIGHT FOOT WITH ROUTINE HEALING, SUBSEQUENT ENCOUNTER: ICD-10-CM

## 2024-07-16 DIAGNOSIS — M72.2 PLANTAR FASCIITIS: ICD-10-CM

## 2024-07-16 DIAGNOSIS — G89.29 CHRONIC PAIN OF LEFT KNEE: ICD-10-CM

## 2024-07-16 DIAGNOSIS — G44.209 TENSION-TYPE HEADACHE, NOT INTRACTABLE, UNSPECIFIED CHRONICITY PATTERN: ICD-10-CM

## 2024-07-16 DIAGNOSIS — M79.672 FOOT PAIN, BILATERAL: ICD-10-CM

## 2024-07-16 DIAGNOSIS — M25.562 CHRONIC PAIN OF LEFT KNEE: ICD-10-CM

## 2024-07-16 DIAGNOSIS — E61.1 IRON DEFICIENCY: ICD-10-CM

## 2024-07-16 DIAGNOSIS — M79.7 FIBROMYALGIA: ICD-10-CM

## 2024-07-16 DIAGNOSIS — I10 PRIMARY HYPERTENSION: ICD-10-CM

## 2024-07-16 DIAGNOSIS — G56.00 CARPAL TUNNEL SYNDROME, UNSPECIFIED LATERALITY: Primary | ICD-10-CM

## 2024-07-16 DIAGNOSIS — I34.1 MITRAL VALVE PROLAPSE: ICD-10-CM

## 2024-07-16 DIAGNOSIS — R20.0 NUMBNESS OF FACE: ICD-10-CM

## 2024-07-16 DIAGNOSIS — E66.01 CLASS 3 SEVERE OBESITY DUE TO EXCESS CALORIES WITH BODY MASS INDEX (BMI) OF 40.0 TO 44.9 IN ADULT, UNSPECIFIED WHETHER SERIOUS COMORBIDITY PRESENT: ICD-10-CM

## 2024-07-16 PROCEDURE — 99214 OFFICE O/P EST MOD 30 MIN: CPT | Mod: PBBFAC,25 | Performed by: INTERNAL MEDICINE

## 2024-07-16 PROCEDURE — 73630 X-RAY EXAM OF FOOT: CPT | Mod: TC,50

## 2024-07-16 RX ORDER — AMITRIPTYLINE HYDROCHLORIDE 25 MG/1
25 TABLET, FILM COATED ORAL NIGHTLY PRN
Qty: 30 TABLET | Refills: 0 | Status: SHIPPED | OUTPATIENT
Start: 2024-07-16

## 2024-07-16 RX ORDER — GABAPENTIN 300 MG/1
300 CAPSULE ORAL 3 TIMES DAILY
Qty: 90 CAPSULE | Refills: 2 | Status: SHIPPED | OUTPATIENT
Start: 2024-07-16 | End: 2024-10-14

## 2024-07-16 NOTE — PROGRESS NOTES
Subjective     Patient ID: Tara Potts is a 48 y.o. female.    Chief Complaint: Follow-up    Follow-up     sort of being there all day long  Patient comes to us with a primary complaint of a headache for 2 weeks sort of being there all day long and globally she does have neck pain low back pain bilateral foot pain suffers from fibromyalgia she is having poor sleep not sure why goes to bed about 10:00 a.m. wakes up a couple hours later did have migraine headaches in the past no change in vision fever chills sweats  She says she is tired all the time has a colonoscopy due in February of 26, gyn this August in 1 month mammogram this November her dysfunctional uterine bleeding is well controlled with Depo-Medrol Depo-Provera shots as minutes stirred per gyn    Recommendations is to try ibuprofen 600 mg and 2 Tylenol q.h.s. she can also take this t.i.d. p.r.n. foot pain we are going to give her Elavil 25 mg q.h.s. to see if that helps with headaches and sleep increase her gabapentin to 300 mg t.i.d. she is going to try low-impact exercises x-ray her feet consider referral if necessary to Podiatry or Orthopedics laboratories today to include TSH follow up in 4 months  Review of Systems   All other systems reviewed and are negative.         Objective     Physical Exam  Vitals and nursing note reviewed.   Constitutional:       Appearance: Normal appearance. She is obese.   HENT:      Head: Normocephalic and atraumatic.      Right Ear: Tympanic membrane, ear canal and external ear normal.      Left Ear: Tympanic membrane, ear canal and external ear normal.      Nose: Nose normal.      Mouth/Throat:      Mouth: Mucous membranes are dry.      Pharynx: Oropharynx is clear.   Eyes:      Extraocular Movements: Extraocular movements intact.      Conjunctiva/sclera: Conjunctivae normal.      Pupils: Pupils are equal, round, and reactive to light.   Cardiovascular:      Rate and Rhythm: Normal rate and regular rhythm.       Pulses: Normal pulses.      Heart sounds: Normal heart sounds.   Pulmonary:      Effort: Pulmonary effort is normal.      Breath sounds: Normal breath sounds.   Abdominal:      General: Bowel sounds are normal.      Palpations: Abdomen is soft.   Musculoskeletal:      Cervical back: Normal range of motion and neck supple.   Skin:     General: Skin is warm and dry.   Neurological:      General: No focal deficit present.      Mental Status: She is alert and oriented to person, place, and time. Mental status is at baseline.   Psychiatric:         Mood and Affect: Mood normal.         Behavior: Behavior normal.         Thought Content: Thought content normal.         Judgment: Judgment normal.            Assessment and Plan     1. Carpal tunnel syndrome, unspecified laterality    2. Numbness of face    3. Mixed anxiety depressive disorder    4. Primary hypertension    5. Atrial septal aneurysm    6. Mitral valve prolapse    7. Dysfunctional uterine bleeding  -     Cancel: Comprehensive Metabolic Panel; Future; Expected date: 07/16/2024  -     Cancel: CBC Auto Differential; Future; Expected date: 07/16/2024  -     Cancel: Iron and TIBC; Future; Expected date: 07/16/2024  -     CBC Auto Differential; Future; Expected date: 07/16/2024  -     Comprehensive Metabolic Panel; Future; Expected date: 07/16/2024  -     Iron and TIBC; Future; Expected date: 07/16/2024    8. Iron deficiency anemia due to chronic blood loss    9. Iron deficiency  -     Cancel: Comprehensive Metabolic Panel; Future; Expected date: 07/16/2024  -     Cancel: CBC Auto Differential; Future; Expected date: 07/16/2024  -     Cancel: Iron and TIBC; Future; Expected date: 07/16/2024  -     CBC Auto Differential; Future; Expected date: 07/16/2024  -     Comprehensive Metabolic Panel; Future; Expected date: 07/16/2024  -     Iron and TIBC; Future; Expected date: 07/16/2024    10. Class 3 severe obesity due to excess calories with body mass index (BMI) of 40.0  to 44.9 in adult, unspecified whether serious comorbidity present    11. Vitamin D deficiency    12. Gastroesophageal reflux disease with esophagitis without hemorrhage    13. Plantar fasciitis    14. Open fracture of right foot with routine healing, subsequent encounter    15. Osteopenia, unspecified location  -     DXA Bone Density Axial Skeleton 1 or more sites; Future; Expected date: 07/16/2024    16. Chronic pain of left knee    17. Fibromyalgia  -     gabapentin (NEURONTIN) 300 MG capsule; Take 1 capsule (300 mg total) by mouth 3 (three) times daily.  Dispense: 90 capsule; Refill: 2    18. Foot pain, bilateral  -     Cancel: X-Ray Foot Complete Bilateral; Future; Expected date: 07/16/2024  -     X-Ray Foot Complete Bilateral; Future; Expected date: 07/16/2024    19. Tension-type headache, not intractable, unspecified chronicity pattern  -     amitriptyline (ELAVIL) 25 MG tablet; Take 1 tablet (25 mg total) by mouth nightly as needed for Insomnia.  Dispense: 30 tablet; Refill: 0        Follow up on iron-deficiency anemia osteopenia to include DEXA scan in November fibromyalgia headaches for sleep bilateral foot pain history of plantar fasciitis chronic fatigue    She will call us if she is not better soon         Follow up in about 4 months (around 11/16/2024).

## 2024-07-22 ENCOUNTER — HOSPITAL ENCOUNTER (EMERGENCY)
Facility: HOSPITAL | Age: 49
Discharge: HOME | End: 2024-07-22
Attending: EMERGENCY MEDICINE

## 2024-07-22 VITALS
BODY MASS INDEX: 42.66 KG/M2 | HEIGHT: 66 IN | WEIGHT: 265.44 LBS | TEMPERATURE: 98 F | RESPIRATION RATE: 20 BRPM | DIASTOLIC BLOOD PRESSURE: 60 MMHG | HEART RATE: 65 BPM | SYSTOLIC BLOOD PRESSURE: 125 MMHG | OXYGEN SATURATION: 99 %

## 2024-07-22 DIAGNOSIS — N20.1 LEFT URETERAL STONE: Primary | ICD-10-CM

## 2024-07-22 LAB
ANION GAP SERPL CALC-SCNC: 13 MEQ/L
B-HCG UR QL: NEGATIVE
BACTERIA #/AREA URNS AUTO: ABNORMAL /HPF
BASOPHILS # BLD AUTO: 0.03 X10(3)/MCL
BASOPHILS NFR BLD AUTO: 0.7 %
BILIRUB UR QL STRIP.AUTO: NEGATIVE
BUN SERPL-MCNC: 7.9 MG/DL (ref 7–18.7)
CALCIUM SERPL-MCNC: 9.5 MG/DL (ref 8.4–10.2)
CHLORIDE SERPL-SCNC: 110 MMOL/L (ref 98–107)
CLARITY UR: ABNORMAL
CO2 SERPL-SCNC: 19 MMOL/L (ref 22–29)
COLOR UR AUTO: YELLOW
CREAT SERPL-MCNC: 1.14 MG/DL (ref 0.55–1.02)
CREAT/UREA NIT SERPL: 7
CTP QC/QA: YES
EOSINOPHIL # BLD AUTO: 0.08 X10(3)/MCL (ref 0–0.9)
EOSINOPHIL NFR BLD AUTO: 1.8 %
ERYTHROCYTE [DISTWIDTH] IN BLOOD BY AUTOMATED COUNT: 13.9 % (ref 11.5–17)
GFR SERPLBLD CREATININE-BSD FMLA CKD-EPI: 60 ML/MIN/1.73/M2
GLUCOSE SERPL-MCNC: 142 MG/DL (ref 74–100)
GLUCOSE UR QL STRIP: NORMAL
HCT VFR BLD AUTO: 41.7 % (ref 37–47)
HGB BLD-MCNC: 13 G/DL (ref 12–16)
HGB UR QL STRIP: ABNORMAL
HYALINE CASTS #/AREA URNS LPF: ABNORMAL /LPF
IMM GRANULOCYTES # BLD AUTO: 0.02 X10(3)/MCL (ref 0–0.04)
IMM GRANULOCYTES NFR BLD AUTO: 0.5 %
KETONES UR QL STRIP: NEGATIVE
LEUKOCYTE ESTERASE UR QL STRIP: 25
LYMPHOCYTES # BLD AUTO: 2.04 X10(3)/MCL (ref 0.6–4.6)
LYMPHOCYTES NFR BLD AUTO: 46.3 %
MAGNESIUM SERPL-MCNC: 2.1 MG/DL (ref 1.6–2.6)
MCH RBC QN AUTO: 25.7 PG (ref 27–31)
MCHC RBC AUTO-ENTMCNC: 31.2 G/DL (ref 33–36)
MCV RBC AUTO: 82.4 FL (ref 80–94)
MONOCYTES # BLD AUTO: 0.27 X10(3)/MCL (ref 0.1–1.3)
MONOCYTES NFR BLD AUTO: 6.1 %
MUCOUS THREADS URNS QL MICRO: ABNORMAL /LPF
NEUTROPHILS # BLD AUTO: 1.97 X10(3)/MCL (ref 2.1–9.2)
NEUTROPHILS NFR BLD AUTO: 44.6 %
NITRITE UR QL STRIP: NEGATIVE
NRBC BLD AUTO-RTO: 0 %
PH UR STRIP: 7.5 [PH]
PLATELET # BLD AUTO: 234 X10(3)/MCL (ref 130–400)
PLATELETS.RETICULATED NFR BLD AUTO: 2.4 % (ref 0.9–11.2)
PMV BLD AUTO: 10.4 FL (ref 7.4–10.4)
POTASSIUM SERPL-SCNC: 3.4 MMOL/L (ref 3.5–5.1)
PROT UR QL STRIP: ABNORMAL
RBC # BLD AUTO: 5.06 X10(6)/MCL (ref 4.2–5.4)
RBC #/AREA URNS AUTO: >100 /HPF
SODIUM SERPL-SCNC: 142 MMOL/L (ref 136–145)
SP GR UR STRIP.AUTO: 1.02 (ref 1–1.03)
SQUAMOUS #/AREA URNS LPF: ABNORMAL /HPF
UROBILINOGEN UR STRIP-ACNC: NORMAL
WBC # BLD AUTO: 4.41 X10(3)/MCL (ref 4.5–11.5)
WBC #/AREA URNS AUTO: ABNORMAL /HPF

## 2024-07-22 PROCEDURE — 81001 URINALYSIS AUTO W/SCOPE: CPT | Performed by: EMERGENCY MEDICINE

## 2024-07-22 PROCEDURE — 25000003 PHARM REV CODE 250: Performed by: INTERNAL MEDICINE

## 2024-07-22 PROCEDURE — 96374 THER/PROPH/DIAG INJ IV PUSH: CPT

## 2024-07-22 PROCEDURE — 80048 BASIC METABOLIC PNL TOTAL CA: CPT | Performed by: EMERGENCY MEDICINE

## 2024-07-22 PROCEDURE — 85025 COMPLETE CBC W/AUTO DIFF WBC: CPT | Performed by: EMERGENCY MEDICINE

## 2024-07-22 PROCEDURE — 87077 CULTURE AEROBIC IDENTIFY: CPT | Performed by: EMERGENCY MEDICINE

## 2024-07-22 PROCEDURE — 87086 URINE CULTURE/COLONY COUNT: CPT | Performed by: EMERGENCY MEDICINE

## 2024-07-22 PROCEDURE — 25500020 PHARM REV CODE 255: Performed by: INTERNAL MEDICINE

## 2024-07-22 PROCEDURE — 83735 ASSAY OF MAGNESIUM: CPT | Performed by: EMERGENCY MEDICINE

## 2024-07-22 PROCEDURE — 96361 HYDRATE IV INFUSION ADD-ON: CPT

## 2024-07-22 PROCEDURE — 25000003 PHARM REV CODE 250: Performed by: EMERGENCY MEDICINE

## 2024-07-22 PROCEDURE — 63600175 PHARM REV CODE 636 W HCPCS: Performed by: EMERGENCY MEDICINE

## 2024-07-22 PROCEDURE — 63600175 PHARM REV CODE 636 W HCPCS: Performed by: INTERNAL MEDICINE

## 2024-07-22 PROCEDURE — 81025 URINE PREGNANCY TEST: CPT | Performed by: EMERGENCY MEDICINE

## 2024-07-22 PROCEDURE — 96375 TX/PRO/DX INJ NEW DRUG ADDON: CPT

## 2024-07-22 PROCEDURE — 99285 EMERGENCY DEPT VISIT HI MDM: CPT | Mod: 25

## 2024-07-22 RX ORDER — ONDANSETRON 4 MG/1
4 TABLET, ORALLY DISINTEGRATING ORAL EVERY 6 HOURS PRN
Qty: 15 TABLET | Refills: 0 | Status: SHIPPED | OUTPATIENT
Start: 2024-07-22

## 2024-07-22 RX ORDER — TAMSULOSIN HYDROCHLORIDE 0.4 MG/1
0.4 CAPSULE ORAL DAILY
Qty: 14 CAPSULE | Refills: 0 | Status: SHIPPED | OUTPATIENT
Start: 2024-07-22 | End: 2025-07-22

## 2024-07-22 RX ORDER — TAMSULOSIN HYDROCHLORIDE 0.4 MG/1
0.4 CAPSULE ORAL
Status: COMPLETED | OUTPATIENT
Start: 2024-07-22 | End: 2024-07-22

## 2024-07-22 RX ORDER — MORPHINE SULFATE 2 MG/ML
4 INJECTION, SOLUTION INTRAMUSCULAR; INTRAVENOUS
Status: COMPLETED | OUTPATIENT
Start: 2024-07-22 | End: 2024-07-22

## 2024-07-22 RX ORDER — ONDANSETRON HYDROCHLORIDE 2 MG/ML
4 INJECTION, SOLUTION INTRAVENOUS
Status: DISCONTINUED | OUTPATIENT
Start: 2024-07-22 | End: 2024-07-22 | Stop reason: HOSPADM

## 2024-07-22 RX ORDER — KETOROLAC TROMETHAMINE 30 MG/ML
15 INJECTION, SOLUTION INTRAMUSCULAR; INTRAVENOUS
Status: COMPLETED | OUTPATIENT
Start: 2024-07-22 | End: 2024-07-22

## 2024-07-22 RX ORDER — HYDROCODONE BITARTRATE AND ACETAMINOPHEN 7.5; 325 MG/1; MG/1
1 TABLET ORAL EVERY 6 HOURS PRN
Qty: 12 TABLET | Refills: 0 | Status: SHIPPED | OUTPATIENT
Start: 2024-07-22

## 2024-07-22 RX ORDER — CIPROFLOXACIN 500 MG/1
500 TABLET ORAL 2 TIMES DAILY
Qty: 10 TABLET | Refills: 0 | Status: SHIPPED | OUTPATIENT
Start: 2024-07-22 | End: 2024-07-27

## 2024-07-22 RX ORDER — ONDANSETRON HYDROCHLORIDE 2 MG/ML
4 INJECTION, SOLUTION INTRAVENOUS
Status: COMPLETED | OUTPATIENT
Start: 2024-07-22 | End: 2024-07-22

## 2024-07-22 RX ORDER — PROCHLORPERAZINE EDISYLATE 5 MG/ML
5 INJECTION INTRAMUSCULAR; INTRAVENOUS
Status: COMPLETED | OUTPATIENT
Start: 2024-07-22 | End: 2024-07-22

## 2024-07-22 RX ADMIN — MORPHINE SULFATE 4 MG: 2 INJECTION, SOLUTION INTRAMUSCULAR; INTRAVENOUS at 05:07

## 2024-07-22 RX ADMIN — IOHEXOL 100 ML: 350 INJECTION, SOLUTION INTRAVENOUS at 06:07

## 2024-07-22 RX ADMIN — KETOROLAC TROMETHAMINE 15 MG: 30 INJECTION, SOLUTION INTRAMUSCULAR; INTRAVENOUS at 05:07

## 2024-07-22 RX ADMIN — PROCHLORPERAZINE EDISYLATE 5 MG: 5 INJECTION INTRAMUSCULAR; INTRAVENOUS at 05:07

## 2024-07-22 RX ADMIN — ONDANSETRON 4 MG: 2 INJECTION INTRAMUSCULAR; INTRAVENOUS at 05:07

## 2024-07-22 RX ADMIN — TAMSULOSIN HYDROCHLORIDE 0.4 MG: 0.4 CAPSULE ORAL at 05:07

## 2024-07-22 RX ADMIN — SODIUM CHLORIDE 1000 ML: 9 INJECTION, SOLUTION INTRAVENOUS at 05:07

## 2024-07-22 NOTE — ED PROVIDER NOTES
ED PROVIDER NOTE  2024    CHIEF COMPLAINT:   Chief Complaint   Patient presents with    Flank Pain    Vomiting     Left flank pain x 1 week with nausea and vomiting that started today       HISTORY OF PRESENT ILLNESS:   Tara Potts is a 48 y.o. female who presents with chief complaint Flank pain. Onset was about a week ago when she began having left flank pain that was intermittent until today when it became severe and constant associated with nausea and vomiting. She reports when she would intermittently get the pain over the past week she would get the urge to defecate but nothing would come out. Denies dysuria, diarrhea, black or bloody stool, fever.    The history is provided by the patient.         REVIEW OF SYSTEMS: as noted in the HPI.  NURSING NOTES REVIEWED      PAST MEDICAL/SURGICAL HISTORY:   Past Medical History:   Diagnosis Date    Abnormal Pap smear of cervix     Breast disorder     Hypertension     Osteopenia       Past Surgical History:   Procedure Laterality Date     SECTION      COLONOSCOPY  2020    Milad Wynn MD    CRYOTHERAPY      TUBAL LIGATION         FAMILY HISTORY:   Family History   Problem Relation Name Age of Onset    Colon cancer Maternal Grandmother      Heart disease Father      Hypertension Mother      Diabetes Mother      Hypertension Sister         SOCIAL HISTORY:   Social History     Tobacco Use    Smoking status: Never    Smokeless tobacco: Never   Substance Use Topics    Alcohol use: Not Currently     Comment: socially     Drug use: Never       ALLERGIES:   Review of patient's allergies indicates:   Allergen Reactions    Sulfa (sulfonamide antibiotics) Hives, Itching, Nausea Only, Palpitations, Rash and Shortness Of Breath       PHYSICAL EXAM:  Initial Vitals [24 1629]   BP Pulse Resp Temp SpO2   139/68 87 (!) 24 98.4 °F (36.9 °C) 100 %      MAP       --         Physical Exam    Nursing note and vitals reviewed.  Constitutional: She appears  well-developed and well-nourished. She is Obese . She appears distressed.   HENT:   Head: Normocephalic and atraumatic.   Mouth/Throat: Uvula is midline and mucous membranes are normal.   Eyes: EOM are normal. Pupils are equal, round, and reactive to light.   Neck: Trachea normal. Neck supple.   Cardiovascular:  Normal rate, regular rhythm and normal pulses.           Pulmonary/Chest: Effort normal and breath sounds normal.   Abdominal: Abdomen is soft. Bowel sounds are normal. There is abdominal tenderness in the left lower quadrant.   No right CVA tenderness.  There is left CVA tenderness. There is no rebound and no guarding.   Musculoskeletal:         General: Normal range of motion.      Cervical back: Neck supple.     Neurological: She is alert and oriented to person, place, and time. GCS eye subscore is 4. GCS verbal subscore is 5. GCS motor subscore is 6.   Skin: Skin is warm and dry.   Psychiatric: She has a normal mood and affect. Her speech is normal. Thought content normal.         RESULTS:  Labs Reviewed   BASIC METABOLIC PANEL - Abnormal       Result Value    Sodium 142      Potassium 3.4 (*)     Chloride 110 (*)     CO2 19 (*)     Glucose 142 (*)     Blood Urea Nitrogen 7.9      Creatinine 1.14 (*)     BUN/Creatinine Ratio 7      Calcium 9.5      Anion Gap 13.0      eGFR 60     CBC WITH DIFFERENTIAL - Abnormal    WBC 4.41 (*)     RBC 5.06      Hgb 13.0      Hct 41.7      MCV 82.4      MCH 25.7 (*)     MCHC 31.2 (*)     RDW 13.9      Platelet 234      MPV 10.4      IPF 2.4      Neut % 44.6      Lymph % 46.3      Mono % 6.1      Eos % 1.8      Basophil % 0.7      Lymph # 2.04      Neut # 1.97 (*)     Mono # 0.27      Eos # 0.08      Baso # 0.03      IG# 0.02      IG% 0.5      NRBC% 0.0     URINALYSIS, REFLEX TO URINE CULTURE - Abnormal    Color, UA Yellow      Appearance, UA Turbid (*)     Specific Gravity, UA 1.021      pH, UA 7.5      Protein, UA Trace (*)     Glucose, UA Normal      Ketones, UA  Negative      Blood, UA 3+ (*)     Bilirubin, UA Negative      Urobilinogen, UA Normal      Nitrites, UA Negative      Leukocyte Esterase, UA 25 (*)     RBC, UA >100 (*)     WBC, UA 11-20 (*)     Bacteria, UA None Seen      Squamous Epithelial Cells, UA Occ (*)     Mucous, UA Few (*)     Hyaline Casts, UA None Seen     MAGNESIUM - Normal    Magnesium Level 2.10     CULTURE, URINE   CBC W/ AUTO DIFFERENTIAL    Narrative:     The following orders were created for panel order CBC auto differential.  Procedure                               Abnormality         Status                     ---------                               -----------         ------                     CBC with Differential[[id number]]       Abnormal            Final result                 Please view results for these tests on the individual orders.   POCT URINE PREGNANCY    POC Preg Test, Ur Negative       Acceptable Yes       Imaging Results              CT Abdomen Pelvis With IV Contrast NO Oral Contrast (Final result)  Result time 07/22/24 18:45:11      Final result by Tawana Cormier MD (07/22/24 18:45:11)                   Impression:      1. 5 mm calculus in the distal left ureter with mild hydronephrosis and perinephric inflammatory changes.  2. Additional bilateral nonobstructing renal calculi.      Electronically signed by: Tawana Cormier  Date:    07/22/2024  Time:    18:45               Narrative:    EXAMINATION:  CT ABDOMEN PELVIS WITH IV CONTRAST    CLINICAL HISTORY:  LLQ abdominal pain;    TECHNIQUE:  CT imaging was performed of the abdomen and pelvis after the administration of intravenous contrast. Dose length product is 1688 mGycm. Automatic exposure control, adjustment of mA/kV or iterative reconstruction technique was used to limit radiation dose.    COMPARISON:  None    FINDINGS:  Liver: Normal.    Gallbladder and biliary tree: No calcified gallstones. No intra or extrahepatic biliary ductal  dilation.    Pancreas: Normal.    Spleen: Normal.    Adrenals: Normal.    Kidneys and ureters: There is a 5 mm calculus in the distal left ureter with mild left hydronephrosis and perinephric stranding.  Additional nonobstructing bilateral renal calculi are noted.    Bladder: Normal.    Reproductive organs: No pelvic masses.    Stomach/bowel: No evidence of bowel obstruction. Appendix is normal. No discernible bowel inflammation.    Lymph nodes: No pathologically enlarged lymph node identified.    Peritoneum: No ascites or free air. No fluid collection.    Vessels: No abdominal aortic aneurysm.    Abdominal wall: Normal.    Lung bases: No consolidation or pleural effusion.    Bones: No acute osseous findings.                                      PROCEDURES:  Procedures    ECG:       ED COURSE AND MEDICAL DECISION MAKING:  Medications   ondansetron injection 4 mg (4 mg Intravenous Not Given 7/22/24 1645)   sodium chloride 0.9% bolus 1,000 mL 1,000 mL (1,000 mLs Intravenous New Bag 7/22/24 1712)   ketorolac injection 15 mg (15 mg Intravenous Given 7/22/24 1706)   ondansetron injection 4 mg (4 mg Intravenous Given 7/22/24 1707)   prochlorperazine injection Soln 5 mg (5 mg Intravenous Given 7/22/24 1724)   morphine injection 4 mg (4 mg Intravenous Given 7/22/24 1752)   tamsulosin 24 hr capsule 0.4 mg (0.4 mg Oral Given 7/22/24 1752)   iohexoL (OMNIPAQUE 350) injection 100 mL (100 mLs Intravenous Given 7/22/24 1841)           Medical Decision Making  Amount and/or Complexity of Data Reviewed  Labs: ordered.  Radiology: ordered.    Risk  Prescription drug management.        CLINICAL IMPRESSION:  1. Left ureteral stone        DISPOSITION:   ED Disposition Condition    Discharge Stable            ED Prescriptions       Medication Sig Dispense Start Date End Date Auth. Provider    tamsulosin (FLOMAX) 0.4 mg Cap Take 1 capsule (0.4 mg total) by mouth once daily. 14 capsule 7/22/2024 7/22/2025 Harvey Gilbert MD     HYDROcodone-acetaminophen (NORCO) 7.5-325 mg per tablet Take 1 tablet by mouth every 6 (six) hours as needed for Pain. 12 tablet 7/22/2024 -- Harvey Gilbert MD    ondansetron (ZOFRAN-ODT) 4 MG TbDL Take 1 tablet (4 mg total) by mouth every 6 (six) hours as needed. 15 tablet 7/22/2024 -- Harvey Gilbert MD    ciprofloxacin HCl (CIPRO) 500 MG tablet Take 1 tablet (500 mg total) by mouth 2 (two) times daily. for 5 days 10 tablet 7/22/2024 7/27/2024 Harvey Gilbert MD          Follow-up Information       Follow up With Specialties Details Why Contact Info    Delvin Connolly MD Pulmonary Disease Schedule an appointment as soon as possible for a visit in 2 weeks  [address] [facility] [address]  [phone]      [facility] Emergency Medicine  If symptoms worsen [address] [facility] [id number] [phone]               Harvey Gilbert MD  07/22/24 4997

## 2024-07-22 NOTE — Clinical Note
"Tara Macias" [place] was seen and treated in our emergency department on 7/22/2024.  She may return to work on 07/25/2024.       If you have any questions or concerns, please don't hesitate to call.       RN    "

## 2024-07-24 LAB — BACTERIA UR CULT: ABNORMAL

## 2024-07-27 ENCOUNTER — HOSPITAL ENCOUNTER (EMERGENCY)
Facility: HOSPITAL | Age: 49
Discharge: HOME | End: 2024-07-27
Attending: STUDENT IN AN ORGANIZED HEALTH CARE EDUCATION/TRAINING PROGRAM

## 2024-07-27 VITALS
TEMPERATURE: 99 F | HEART RATE: 89 BPM | BODY MASS INDEX: 42.75 KG/M2 | WEIGHT: 266 LBS | DIASTOLIC BLOOD PRESSURE: 76 MMHG | HEIGHT: 66 IN | RESPIRATION RATE: 18 BRPM | OXYGEN SATURATION: 100 % | SYSTOLIC BLOOD PRESSURE: 130 MMHG

## 2024-07-27 DIAGNOSIS — K04.7 DENTAL ABSCESS: Primary | ICD-10-CM

## 2024-07-27 PROCEDURE — 96372 THER/PROPH/DIAG INJ SC/IM: CPT | Performed by: PHYSICIAN ASSISTANT

## 2024-07-27 PROCEDURE — 63600175 PHARM REV CODE 636 W HCPCS: Performed by: PHYSICIAN ASSISTANT

## 2024-07-27 PROCEDURE — 99284 EMERGENCY DEPT VISIT MOD MDM: CPT | Mod: 25

## 2024-07-27 PROCEDURE — 25000003 PHARM REV CODE 250: Performed by: PHYSICIAN ASSISTANT

## 2024-07-27 RX ORDER — KETOROLAC TROMETHAMINE 30 MG/ML
30 INJECTION, SOLUTION INTRAMUSCULAR; INTRAVENOUS
Status: COMPLETED | OUTPATIENT
Start: 2024-07-27 | End: 2024-07-27

## 2024-07-27 RX ORDER — CHLORHEXIDINE GLUCONATE ORAL RINSE 1.2 MG/ML
15 SOLUTION DENTAL 2 TIMES DAILY
Qty: 300 ML | Refills: 0 | Status: SHIPPED | OUTPATIENT
Start: 2024-07-27 | End: 2024-08-06

## 2024-07-27 RX ORDER — AMOXICILLIN AND CLAVULANATE POTASSIUM 875; 125 MG/1; MG/1
1 TABLET, FILM COATED ORAL
Status: COMPLETED | OUTPATIENT
Start: 2024-07-27 | End: 2024-07-27

## 2024-07-27 RX ORDER — AMOXICILLIN 875 MG/1
875 TABLET, FILM COATED ORAL 2 TIMES DAILY
Qty: 14 TABLET | Refills: 0 | Status: SHIPPED | OUTPATIENT
Start: 2024-07-27 | End: 2024-08-03

## 2024-07-27 RX ORDER — IBUPROFEN 800 MG/1
800 TABLET ORAL 3 TIMES DAILY PRN
Qty: 30 TABLET | Refills: 0 | Status: SHIPPED | OUTPATIENT
Start: 2024-07-27

## 2024-07-27 RX ORDER — FLUCONAZOLE 200 MG/1
200 TABLET ORAL DAILY
Qty: 1 TABLET | Refills: 0 | Status: SHIPPED | OUTPATIENT
Start: 2024-07-27 | End: 2024-07-28

## 2024-07-27 RX ADMIN — KETOROLAC TROMETHAMINE 30 MG: 30 INJECTION, SOLUTION INTRAMUSCULAR; INTRAVENOUS at 05:07

## 2024-07-27 RX ADMIN — AMOXICILLIN AND CLAVULANATE POTASSIUM 1 TABLET: 875; 125 TABLET ORAL at 05:07

## 2024-07-27 NOTE — ED PROVIDER NOTES
Encounter Date: 2024       History     Chief Complaint   Patient presents with    Facial Swelling     Right lower jaw dental pain with facial swelling that started night. States currently on cipro related to known kidney stone.      48-year-old female presents to the emergency department with complaints of right lower jaw swelling and dental pain that began yesterday.  She is currently taking Cipro related to kidney stone.  Patient states she has a broken tooth and will be calling to schedule an appointment with a dentist on Monday.  She rates her pain 8/10.  She denies fever, ear pain, difficulty swallowing.      The history is provided by the patient. No  was used.     Review of patient's allergies indicates:   Allergen Reactions    Sulfa (sulfonamide antibiotics) Hives, Itching, Nausea Only, Palpitations, Rash and Shortness Of Breath     Past Medical History:   Diagnosis Date    Abnormal Pap smear of cervix     Breast disorder     Hypertension     Osteopenia      Past Surgical History:   Procedure Laterality Date     SECTION      COLONOSCOPY  2020    Milad Wynn MD    CRYOTHERAPY      TUBAL LIGATION       Family History   Problem Relation Name Age of Onset    Colon cancer Maternal Grandmother      Heart disease Father      Hypertension Mother      Diabetes Mother      Hypertension Sister       Social History     Tobacco Use    Smoking status: Never    Smokeless tobacco: Never   Substance Use Topics    Alcohol use: Not Currently     Comment: socially     Drug use: Never     Review of Systems   Constitutional:  Negative for chills and fever.   HENT:  Positive for dental problem and facial swelling (right lower). Negative for sore throat.    Eyes: Negative.    Respiratory:  Negative for cough, chest tightness and shortness of breath.    Cardiovascular:  Negative for chest pain and palpitations.   Gastrointestinal:  Negative for abdominal pain, diarrhea, nausea and  vomiting.   Neurological:  Negative for dizziness, light-headedness and headaches.       Physical Exam     Initial Vitals [07/27/24 1644]   BP Pulse Resp Temp SpO2   130/76 89 18 98.5 °F (36.9 °C) 100 %      MAP       --         Physical Exam    Nursing note and vitals reviewed.  Constitutional: She appears well-developed and well-nourished.   HENT:   Nose: Nose normal.   Mouth/Throat: Abnormal dentition. Dental abscesses and dental caries present.   Right lower face/jaw swelling, no erythema    Eyes: Conjunctivae are normal.   Neck: Neck supple.   Normal range of motion.  Cardiovascular:  Normal rate, regular rhythm, normal heart sounds and intact distal pulses.           Pulmonary/Chest: Breath sounds normal.   Abdominal: Abdomen is soft. Bowel sounds are normal.   Musculoskeletal:         General: Normal range of motion.      Cervical back: Normal range of motion and neck supple.     Neurological: She is alert.   Skin: Skin is warm. Capillary refill takes less than 2 seconds.         ED Course   Procedures  Labs Reviewed - No data to display       Imaging Results    None          Medications   ketorolac injection 30 mg (30 mg Intramuscular Given 7/27/24 1729)   amoxicillin-clavulanate 875-125mg per tablet 1 tablet (1 tablet Oral Given 7/27/24 1729)     Medical Decision Making  48-year-old female presents to the emergency department with complaints of right lower jaw swelling and dental pain that began yesterday.  She is currently taking Cipro related to kidney stone.  Patient states she has a broken tooth and will be calling to schedule an appointment with a dentist on Monday.  She rates her pain 8/10.  She denies fever, ear pain, difficulty swallowing.      DDx: Dental abscess, dental injury, dental caries    Toradol IM and Augmentin given in the ED.  Amoxicillin, Peridex, ibuprofen and Diflucan sent to pharmacy for dental abscess.   Gave dental resources to patient and discussed strict ED precautions.       Risk  Prescription drug management.               ED Course as of 07/27/24 1931   Sat Jul 27, 2024   6487 The patient is resting comfortably and in no acute distress.  She states that her symptoms have improved after treatment in Emergency Department. I personally discussed her treatment plan.  Gave strict ED precautions, discussed specific conditions for return to the emergency department and importance of follow up with her primary care provider and dentist.   Patient voices understanding and agrees to the plan discussed. All patients' questions have been answered at this time.   She has remained hemodynamically stable throughout entire stay in ED and is stable for discharge home.  [ER]      ED Course User Index  [ER] Leda Kahn PA                           Clinical Impression:  Final diagnoses:  [K04.7] Dental abscess (Primary)          ED Disposition Condition    Discharge Stable          ED Prescriptions       Medication Sig Dispense Start Date End Date Auth. Provider    amoxicillin (AMOXIL) 875 MG tablet Take 1 tablet (875 mg total) by mouth 2 (two) times daily. for 7 days 14 tablet 7/27/2024 8/3/2024 Leda Kahn PA    chlorhexidine (PERIDEX) 0.12 % solution Use as directed 15 mLs in the mouth or throat 2 (two) times daily. for 10 days 300 mL 7/27/2024 8/6/2024 Leda Kahn PA    ibuprofen (ADVIL,MOTRIN) 800 MG tablet Take 1 tablet (800 mg total) by mouth 3 (three) times daily as needed for Pain. 30 tablet 7/27/2024 -- Leda Kahn PA    fluconazole (DIFLUCAN) 200 MG Tab Take 1 tablet (200 mg total) by mouth once daily. for 1 dose 1 tablet 7/27/2024 7/28/2024 Leda Kahn PA          Follow-up Information       Follow up With Specialties Details Why Contact Info    [facility] Emergency Medicine  As needed, If symptoms worsen [address] [facility] [id number] [phone]    Dentist of choice  Schedule an appointment as soon as possible for a  visit                Leda Kahn PA  07/27/24 1931

## 2024-07-27 NOTE — DISCHARGE INSTRUCTIONS
Take medication as prescribed with food and water until complete.  Take 1 dose of Diflucan after you finish taking antibiotics.  Follow up with a dentist within a week and return if symptoms worsen.

## 2024-07-29 NOTE — PROGRESS NOTES
Per Dr. Ocampo, due positive urine culture, Augmentin 875mg BID times 5 days called into [facility] on [address] per patient request.  Patient instructed to stop taking current abx, patient reports understanding.

## 2024-08-10 DIAGNOSIS — G44.209 TENSION-TYPE HEADACHE, NOT INTRACTABLE, UNSPECIFIED CHRONICITY PATTERN: ICD-10-CM

## 2024-08-12 RX ORDER — AMITRIPTYLINE HYDROCHLORIDE 25 MG/1
25 TABLET, FILM COATED ORAL NIGHTLY
Qty: 30 TABLET | Refills: 0 | Status: SHIPPED | OUTPATIENT
Start: 2024-08-12

## 2024-08-21 ENCOUNTER — OFFICE VISIT (OUTPATIENT)
Dept: GYNECOLOGY | Facility: CLINIC | Age: 49
End: 2024-08-21

## 2024-08-21 VITALS
HEART RATE: 76 BPM | SYSTOLIC BLOOD PRESSURE: 121 MMHG | WEIGHT: 260 LBS | RESPIRATION RATE: 20 BRPM | BODY MASS INDEX: 41.78 KG/M2 | HEIGHT: 66 IN | DIASTOLIC BLOOD PRESSURE: 79 MMHG | TEMPERATURE: 98 F

## 2024-08-21 DIAGNOSIS — N89.8 VAGINAL IRRITATION: ICD-10-CM

## 2024-08-21 DIAGNOSIS — N92.0 MENORRHAGIA WITH REGULAR CYCLE: ICD-10-CM

## 2024-08-21 DIAGNOSIS — Z12.4 PAP SMEAR FOR CERVICAL CANCER SCREENING: Primary | ICD-10-CM

## 2024-08-21 DIAGNOSIS — Z12.31 VISIT FOR SCREENING MAMMOGRAM: ICD-10-CM

## 2024-08-21 LAB
CLUE CELLS VAG QL WET PREP: NORMAL
T VAGINALIS VAG QL WET PREP: NORMAL
WBC #/AREA VAG WET PREP: NORMAL
YEAST SPEC QL WET PREP: NORMAL

## 2024-08-21 PROCEDURE — 99396 PREV VISIT EST AGE 40-64: CPT | Mod: S$PBB,,, | Performed by: NURSE PRACTITIONER

## 2024-08-21 PROCEDURE — 99213 OFFICE O/P EST LOW 20 MIN: CPT | Mod: PBBFAC | Performed by: NURSE PRACTITIONER

## 2024-08-21 PROCEDURE — 87210 SMEAR WET MOUNT SALINE/INK: CPT | Performed by: NURSE PRACTITIONER

## 2024-08-21 RX ORDER — MEDROXYPROGESTERONE ACETATE 10 MG/1
10 TABLET ORAL DAILY
Qty: 30 TABLET | Refills: 12 | Status: SHIPPED | OUTPATIENT
Start: 2024-08-21

## 2024-08-21 NOTE — PROGRESS NOTES
[facility] -  Gynecology / Women's Health Clinic      Subjective:       Patient ID: Tara Potts is a 48 y.o. female.    Chief Complaint:  Gynecologic Exam    History of Present Illness  Pt is  here for annual gyn exam. Hx of mild dysplasia 18 yrs ago with cryotherapy. All paps since negative. Last pap 3/2021-NIL; HPV negative. Pt with hx of TL for contraception. Currently on depo provera for AUB-L since off and on since . Pt is amenorrheic and wishes to continue Depo. Hx of transfusion in the past prior to Depo. Denies any vaginal bleeding or spotting. Denies vaginal discharge, c/o vaginal irritation, recent use of Diflucan x1 after use of antibiotics. Not currently sexually active. Hx of renal stent in the past secondary to chronic UTIs along with kidney stones. -/-BIRADS 1. Denies breast complaints. Denies tobacco use. HPV vaccinated. Denies fly hx of breast, ovarian or uterine cancer. Admits to [facility] and Mat uncle with colon cancer. Breast cancer in paternal aunt. Osteopenia on DEXA in .     GYN & OB History  No LMP recorded. Patient has had an injection.   Date of Last Pap: 8/10/2021    Review of patient's allergies indicates:   Allergen Reactions    Sulfa (sulfonamide antibiotics) Hives, Itching, Nausea Only, Palpitations, Rash and Shortness Of Breath     Past Medical History:   Diagnosis Date    Abnormal Pap smear of cervix     Breast disorder     Hypertension     Osteopenia      OB History    Para Term  AB Living   4 4           SAB IAB Ectopic Multiple Live Births                  # Outcome Date GA Lbr Tony/2nd Weight Sex Type Anes PTL Lv   4 Para            3 Para            2 Para            1 Para                 Review of Systems  Review of Systems    Negative except for pertinent findings for positives per HPI     Objective:    Physical Exam    /79 (BP Location: Right arm, Patient Position: Sitting, BP Method: Large (Automatic))   Pulse 76  "  Temp 98.3 °F (36.8 °C) (Oral)   Resp 20   Ht 5' 6" (1.676 m)   Wt 117.9 kg (260 lb)   BMI 41.97 kg/m²   GENERAL: Well-developed female. No acute distress.    SKIN: Normal to inspection, warm and intact.  BREASTS: No rashes or erythema. No masses, lumps, discharge, tenderness.  VULVA: General appearance normal; external genitalia with no lesions or erythema.  VAGINA: Mucosa/vaginal vault pink, no abnormal discharge or lesions.  CERVIX: Pink, nulliparous appearing os, no erythema or abnormal discharge.  BIMANUAL EXAM: reveals a 12 week-sized uterus. The uterus is non tender. Nicolas adnexa reveal no tenderness.  PSYCHIATRIC: Patient is oriented to person, place, and time. Mood and affect are normal.    Assessment:         ICD-10-CM ICD-9-CM   1. Pap smear for cervical cancer screening  Z12.4 V76.2   2. Vaginal irritation  N89.8 623.9   3. Menorrhagia with regular cycle  N92.0 626.2   4. Visit for screening mammogram  Z12.31 V76.12     Plan:   Tara was seen today for gynecologic exam.    Diagnoses and all orders for this visit:    Pap smear for cervical cancer screening  -     Liquid-Based Pap Smear, Screening    Vaginal irritation  -     Wet Prep, Genital    Menorrhagia with regular cycle  -     medroxyPROGESTERone (PROVERA) 10 MG tablet; Take 1 tablet (10 mg total) by mouth once daily. Take daily as prescribed.    Visit for screening mammogram  -     Mammo Digital Screening Bilat w/ Ricardo; Future    Pap today  MG ordered  Wet prep  Stop Depo d/t osteopenia likely d/t Depo use, start Provera daily for AUB.  Follow up in about 1 year (around 8/21/2025) for annual exam.    "

## 2024-09-05 ENCOUNTER — OFFICE VISIT (OUTPATIENT)
Dept: UROLOGY | Facility: CLINIC | Age: 49
End: 2024-09-05

## 2024-09-05 VITALS
HEART RATE: 85 BPM | WEIGHT: 262.38 LBS | RESPIRATION RATE: 20 BRPM | BODY MASS INDEX: 42.17 KG/M2 | SYSTOLIC BLOOD PRESSURE: 120 MMHG | HEIGHT: 66 IN | DIASTOLIC BLOOD PRESSURE: 83 MMHG | OXYGEN SATURATION: 98 % | TEMPERATURE: 98 F

## 2024-09-05 DIAGNOSIS — N20.0 RENAL CALCULUS: ICD-10-CM

## 2024-09-05 DIAGNOSIS — N20.1 LEFT URETERAL CALCULUS: Primary | ICD-10-CM

## 2024-09-05 LAB
BILIRUB SERPL-MCNC: NORMAL MG/DL
BLOOD URINE, POC: NORMAL
COLOR, POC UA: YELLOW
GLUCOSE UR QL STRIP: NORMAL
KETONES UR QL STRIP: NORMAL
LEUKOCYTE ESTERASE URINE, POC: NORMAL
NITRITE, POC UA: NORMAL
PH, POC UA: 6
PROTEIN, POC: 30
SPECIFIC GRAVITY, POC UA: >=1.03
UROBILINOGEN, POC UA: 0.2

## 2024-09-05 PROCEDURE — 81001 URINALYSIS AUTO W/SCOPE: CPT | Mod: PBBFAC | Performed by: UROLOGY

## 2024-09-05 PROCEDURE — 99204 OFFICE O/P NEW MOD 45 MIN: CPT | Mod: S$PBB,,, | Performed by: UROLOGY

## 2024-09-05 PROCEDURE — 99215 OFFICE O/P EST HI 40 MIN: CPT | Mod: PBBFAC | Performed by: UROLOGY

## 2024-09-05 NOTE — PROGRESS NOTES
CC:  Left ureteral calculus    HPI:  Tara Potts is a 48 y.o. female seen in consultation for left ureteral calculus.  In July she began with left flank pain.  She presented to the emergency room and had a CT scan which showed a 5mm distal left ureteral calculus with mild hydronephrosis.  Additionally there were bilateral nonobstructing calculi.  I viewed this CT scan and it was done with IV contrast but it appears that there may be a large stone in the left renal pelvis.  She had pain for about a week and then the pain resolved.  She does state now that intermittently she might have a little bit of discomfort but nothing like the first episode.  She did not see anything pass.  She has never had stones before.  Creatinine was up a little bit but her previous values have all been normal.  She did have a positive urine culture for Citrobacter and was treated with Augmentin for that.    Urinalysis:  Results for orders placed or performed in visit on 09/05/24   POCT URINE DIPSTICK WITH MICROSCOPE, AUTOMATED   Result Value Ref Range    Color, UA Yellow     Spec Grav UA >=1.030     pH, UA 6.0     WBC, UA neg     Nitrite, UA neg     Protein, POC 30     Glucose, UA neg     Ketones, UA neg     Urobilinogen, UA 0.2     Bilirubin, POC neg     Blood, UA large      Microscopic Urinalysis:  WBC:   None per HPF     RBC:    10-15 per HPF    Bacteria:    None per HPF     Squamous epithelial cells:  None per HPF       Crystals:   None     Imaging:  CT - 22 July 2024:  5 mm distal ureteral calculus with mild hydronephrosis  Additional bilateral nonobstructing calculi      Data Review:  Note from referring provider, Harvey Nguyen MD dated 22 July 2024.  CT.  Creatinine.  Urine culture.     ROS:  All systems reviewed and are negative except as documented in HPI and/or Assessment and Plan.     Patient Active Problem List:     Patient Active Problem List   Diagnosis    Obesity    Dysfunctional uterine bleeding    Anemia    Morbid  obesity    Plantar fasciitis    Hypertension    Foot fracture    Vitamin D deficiency    Osteopenia    Iron deficiency    Fibromyalgia    Chronic pain of left knee    Atrial septal aneurysm    Carpal tunnel syndrome    Gastroesophageal reflux disease    Menorrhagia    Mitral valve prolapse    Mixed anxiety depressive disorder    Numbness of face    Prehypertension        Past Medical History:  Past Medical History:   Diagnosis Date    Abnormal Pap smear of cervix     Breast disorder     Hypertension     Osteopenia         Past Surgical History:  Past Surgical History:   Procedure Laterality Date     SECTION      COLONOSCOPY  2020    Milad Wynn MD    CRYOTHERAPY      TUBAL LIGATION          Family History:  Family History   Problem Relation Name Age of Onset    Colon cancer Maternal Grandmother      Heart disease Father      Hypertension Mother      Diabetes Mother      Hypertension Sister          Social History:  Social History     Socioeconomic History    Marital status:    Tobacco Use    Smoking status: Never     Passive exposure: Never    Smokeless tobacco: Never   Substance and Sexual Activity    Alcohol use: Not Currently     Comment: socially     Drug use: Never    Sexual activity: Not Currently     Birth control/protection: See Surgical Hx     Social Determinants of Health     Financial Resource Strain: Low Risk  (2024)    Overall Financial Resource Strain (CARDIA)     Difficulty of Paying Living Expenses: Not hard at all   Food Insecurity: No Food Insecurity (2024)    Hunger Vital Sign     Worried About Running Out of Food in the Last Year: Never true     Ran Out of Food in the Last Year: Never true   Transportation Needs: No Transportation Needs (2024)    TRANSPORTATION NEEDS     Transportation : No   Physical Activity: Sufficiently Active (2024)    Exercise Vital Sign     Days of Exercise per Week: 7 days     Minutes of Exercise per Session: 150+ min    Stress: No Stress Concern Present (5/14/2024)    [employer] - Occupational Stress Questionnaire     Feeling of Stress : Only a little   Housing Stability: Low Risk  (5/14/2024)    Housing Stability Vital Sign     Unable to Pay for Housing in the Last Year: No     Homeless in the Last Year: No        Allergies:  Review of patient's allergies indicates:   Allergen Reactions    Sulfa (sulfonamide antibiotics) Hives, Itching, Nausea Only, Palpitations, Rash and Shortness Of Breath        Objective:  Vitals:    09/05/24 1406   BP: 120/83   Pulse: 85   Resp: 20   Temp: 98.3 °F (36.8 °C)     General:  Well developed, well nourished adult female in no acute distress  Abdomen: Soft, nontender, no masses  Extremities:  No clubbing, cyanosis, or edema  Neurologic:  Grossly intact  Musculoskeletal:  Normal tone    Assessment:  1. Left ureteral calculus  - Ambulatory referral/consult to Urology  - POCT URINE DIPSTICK WITH MICROSCOPE, AUTOMATED  - CT Abdomen Pelvis  Without Contrast; Future    2. Renal calculus  - CT Abdomen Pelvis  Without Contrast; Future     Plan:  We do not have any definitive evidence that the stone in the ureter has passed.  She does persist with some pain and significant microscopic hematuria so will get a stone protocol CT.  Evaluate the renal calculi more thoroughly with the stone protocol CT.    Follow-up:    After stone protocol CT.

## 2024-09-05 NOTE — PROGRESS NOTES
Pt seen by Dr. Gordillo; CT ordered & pt given centralized scheduling information sheet; Pt instructed to return to clinic in 1 month; Discharge paperwork given w/pt verbalizing understanding

## 2024-10-02 ENCOUNTER — HOSPITAL ENCOUNTER (OUTPATIENT)
Dept: RADIOLOGY | Facility: HOSPITAL | Age: 49
Discharge: HOME | End: 2024-10-02
Attending: UROLOGY

## 2024-10-02 DIAGNOSIS — N20.1 LEFT URETERAL CALCULUS: ICD-10-CM

## 2024-10-02 DIAGNOSIS — N20.0 RENAL CALCULUS: ICD-10-CM

## 2024-10-02 PROCEDURE — 74176 CT ABD & PELVIS W/O CONTRAST: CPT | Mod: TC

## 2024-11-10 ENCOUNTER — HOSPITAL ENCOUNTER (EMERGENCY)
Facility: HOSPITAL | Age: 49
Discharge: HOME | End: 2024-11-10
Attending: EMERGENCY MEDICINE

## 2024-11-10 VITALS
TEMPERATURE: 98 F | HEIGHT: 66 IN | WEIGHT: 260.13 LBS | SYSTOLIC BLOOD PRESSURE: 128 MMHG | OXYGEN SATURATION: 98 % | HEART RATE: 76 BPM | RESPIRATION RATE: 18 BRPM | DIASTOLIC BLOOD PRESSURE: 78 MMHG | BODY MASS INDEX: 41.8 KG/M2

## 2024-11-10 DIAGNOSIS — M79.672 LEFT FOOT PAIN: ICD-10-CM

## 2024-11-10 DIAGNOSIS — S92.355A CLOSED NONDISPLACED FRACTURE OF FIFTH METATARSAL BONE OF LEFT FOOT, INITIAL ENCOUNTER: Primary | ICD-10-CM

## 2024-11-10 LAB
B-HCG UR QL: NEGATIVE
CTP QC/QA: YES

## 2024-11-10 PROCEDURE — 29515 APPLICATION SHORT LEG SPLINT: CPT | Mod: LT

## 2024-11-10 PROCEDURE — 99284 EMERGENCY DEPT VISIT MOD MDM: CPT | Mod: 25

## 2024-11-10 PROCEDURE — 81025 URINE PREGNANCY TEST: CPT | Performed by: NURSE PRACTITIONER

## 2024-11-10 RX ORDER — HYDROCODONE BITARTRATE AND ACETAMINOPHEN 5; 325 MG/1; MG/1
1 TABLET ORAL EVERY 6 HOURS PRN
Qty: 15 TABLET | Refills: 0 | Status: SHIPPED | OUTPATIENT
Start: 2024-11-10

## 2024-11-10 NOTE — ED PROVIDER NOTES
Encounter Date: 11/10/2024       History     Chief Complaint   Patient presents with    Foot Injury     Thinks she broke her left foot yesterday. States she was rolling a cart and when she turned she heard a pop. Ambulatory, but limping. Pedal pulse +2.      The patient presents with left foot pain. The onset was yesterday.  The course/duration of symptoms is constant. Type of injury: twisted foot. Location: left foot. The character of symptoms is pain and swelling.  The degree at present is moderate. There are exacerbating factors including movement, weight bearing and walking.  The relieving factor is rest. Risk factors consist of none. Prior episodes: none. Therapy today: none. Associated symptoms: none.        Review of patient's allergies indicates:   Allergen Reactions    Sulfa (sulfonamide antibiotics) Hives, Itching, Nausea Only, Palpitations, Rash and Shortness Of Breath     Past Medical History:   Diagnosis Date    Abnormal Pap smear of cervix     Breast disorder     Hypertension     Osteopenia      Past Surgical History:   Procedure Laterality Date     SECTION      COLONOSCOPY  2020    Milad Wynn MD    CRYOTHERAPY      TUBAL LIGATION       Family History   Problem Relation Name Age of Onset    Colon cancer Maternal Grandmother      Heart disease Father      Hypertension Mother      Diabetes Mother      Hypertension Sister       Social History     Tobacco Use    Smoking status: Never     Passive exposure: Never    Smokeless tobacco: Never   Substance Use Topics    Alcohol use: Not Currently     Comment: socially     Drug use: Never     Review of Systems   Constitutional:  Negative for fever.   HENT:  Negative for sore throat.    Respiratory:  Negative for shortness of breath.    Cardiovascular:  Negative for chest pain.   Gastrointestinal:  Negative for nausea.   Genitourinary:  Negative for dysuria.   Musculoskeletal:  Positive for arthralgias. Negative for back pain.   Skin:   Negative for rash.   Neurological:  Negative for weakness.   Hematological:  Does not bruise/bleed easily.   All other systems reviewed and are negative.      Physical Exam     Initial Vitals [11/10/24 1012]   BP Pulse Resp Temp SpO2   114/81 80 18 98.2 °F (36.8 °C) 100 %      MAP       --         Physical Exam    Nursing note and vitals reviewed.  Constitutional: She appears well-developed and well-nourished.   HENT:   Head: Normocephalic and atraumatic.   Neck: Neck supple.   Normal range of motion.  Cardiovascular:  Normal rate, regular rhythm, normal heart sounds and intact distal pulses.           Pulmonary/Chest: Effort normal and breath sounds normal.   Abdominal: Abdomen is soft and flat. Bowel sounds are normal. There is no abdominal tenderness.   Musculoskeletal:         General: Normal range of motion.      Cervical back: Normal range of motion and neck supple.      Comments: Left Foot: mild swelling and ttp to distal dorsal aspect, foot warm with good pulses, NVI       Neurological: She is alert. She has normal strength.   Skin: Skin is warm and dry.   Psychiatric: She has a normal mood and affect.         ED Course   Splint Application    Date/Time: 11/10/2024 12:09 PM    Performed by: David Watts ACNP  Authorized by: David Watts ACNP  Location details: left leg  Splint type: short leg  Supplies used: cotton padding, elastic bandage and Ortho-Glass  Post-procedure: The splinted body part was neurovascularly unchanged following the procedure.  Patient tolerance: Patient tolerated the procedure well with no immediate complications  Comments: She was also fitted for crutches        Labs Reviewed   POCT URINE PREGNANCY       Result Value    POC Preg Test, Ur Negative       Acceptable Yes            Imaging Results              X-Ray Foot Complete Left (Final result)  Result time 11/10/24 11:04:39      Final result by Parris Wilkinson MD (11/10/24 11:04:39)                    Impression:      Nondisplaced fracture of the base of 5th metatarsal    Inferior calcaneal spur      Electronically signed by: Reji Wilkinson  Date:    11/10/2024  Time:    11:04               Narrative:    EXAMINATION:  XR FOOT COMPLETE 3 VIEW LEFT    CLINICAL HISTORY:  .  Pain in left foot    TECHNIQUE:  AP, lateral and oblique views of the left foot were performed.    COMPARISON:  07/16/2024    FINDINGS:  There is a fracture through the base of the 5th metatarsal.  There is transversely oriented.  No other fractures are seen.  There is inferior calcaneal spur.                                       Medications - No data to display  Medical Decision Making  The patient presents with left foot pain. The onset was yesterday.  The course/duration of symptoms is constant. Type of injury: twisted foot. Location: left foot. The character of symptoms is pain and swelling.  The degree at present is moderate. There are exacerbating factors including movement, weight bearing and walking.  The relieving factor is rest. Risk factors consist of none. Prior episodes: none. Therapy today: none. Associated symptoms: none.      Referral sent to ortho clinic.12:10 PM DISPOSITION: The patient is resting comfortably in no acute distress.  She is hemodynamically stable and is without objective evidence for acute process requiring urgent intervention or hospitalization. I provided counseling to patient with regard to condition, the treatment plan, specific conditions for return, and the importance of follow up. Detailed written and verbal instructions provided to patient and she expressed a verbal understanding of the discharge instructions and overall management plan. Reiterated the importance of medication administration and safety and advised patient to follow up with primary care provider in 3-5 days or sooner if needed.  Answered questions at this time. The patient is stable for discharge.       Amount and/or Complexity of Data  Reviewed  Labs: ordered.  Radiology: ordered. Decision-making details documented in ED Course.      Additional MDM:   Differential Diagnosis:   At this time differential diagnosis is but not limited to fracture, sprain, contusion, arthritis              ED Course as of 11/10/24 1214   Sun Nov 10, 2024   1110 X-Ray Foot Complete Left  Impression:     Nondisplaced fracture of the base of 5th metatarsal      [RB]   1208 I counseled patient on risks associated with opioid medication including, but not limited to, physical dependence and/or addiction, confusion, constipation, drowsiness, nausea or vomiting, impairment in driving and/or operating machinery. I also advised the patient that taking more opioids than prescribed or mixing with other central nervous system depressants/sedatives, such as benzodiazepines or alcohol, can result in respiratory depression, hypoxic brain injury, coma, or death.  Given strict ED return precautions. I have spoken with the patient and/or caregivers. I have explained the patient's condition, diagnoses and treatment plan based on the information available to me at this time. I have answered the patient's and/or caregiver's questions and addressed any concerns. The patient and/or caregivers have as good an understanding of the patient's diagnosis, condition and treatment plan as can be expected at this point. The vital signs have been stable. The patient's condition is stable and appropriate for discharge from the emergency department.      The patient will pursue further outpatient evaluation with the primary care physician or other designated or consulting physician as outlined in the discharge instructions. The patient and/or caregivers are agreeable to this plan of care and follow-up instructions have been explained in detail. The patient and/or caregivers have received these instructions in written format and have expressed an understanding of the discharge instructions. The patient  and/or caregivers are aware that any significant change in condition or worsening of symptoms should prompt an immediate return to this or the closest emergency department or a call to 911.           [RB]      ED Course User Index  [RB] David Watts ACNP                           Clinical Impression:  Final diagnoses:  [M79.672] Left foot pain  [S92.355A] Closed nondisplaced fracture of fifth metatarsal bone of left foot, initial encounter (Primary)          ED Disposition Condition    Discharge Stable          ED Prescriptions       Medication Sig Dispense Start Date End Date Auth. Provider    HYDROcodone-acetaminophen (NORCO) 5-325 mg per tablet Take 1 tablet by mouth every 6 (six) hours as needed for Pain. 15 tablet 11/10/2024 -- David Watts ACNP          Follow-up Information       Follow up With Specialties Details Why Contact Info    referral sent to orthopedic clinic        [facility] Emergency Medicine  If symptoms worsen [address] [facility] [place] [id number] [phone]    Delvin Connolly MD Pulmonary Disease, Internal Medicine In 3 days  [address] [facility] [address]  [phone]               David Watts ACNP  11/10/24 1213

## 2024-11-10 NOTE — Clinical Note
"Tara Garciaernst Potts was seen and treated in our emergency department on 11/10/2024.  She may return to work on 11/13/2024.  No weight bearing left foot until cleared by orthopedist     If you have any questions or concerns, please don't hesitate to call.      Edna, Bobby CEDENO MD"

## 2024-11-19 ENCOUNTER — OFFICE VISIT (OUTPATIENT)
Dept: INTERNAL MEDICINE | Facility: CLINIC | Age: 49
End: 2024-11-19

## 2024-11-19 ENCOUNTER — LAB VISIT (OUTPATIENT)
Dept: LAB | Facility: HOSPITAL | Age: 49
End: 2024-11-19
Attending: INTERNAL MEDICINE

## 2024-11-19 VITALS
SYSTOLIC BLOOD PRESSURE: 136 MMHG | TEMPERATURE: 98 F | HEIGHT: 66 IN | RESPIRATION RATE: 18 BRPM | HEART RATE: 71 BPM | WEIGHT: 261.63 LBS | BODY MASS INDEX: 42.05 KG/M2 | OXYGEN SATURATION: 100 % | DIASTOLIC BLOOD PRESSURE: 83 MMHG

## 2024-11-19 DIAGNOSIS — M25.562 CHRONIC PAIN OF LEFT KNEE: ICD-10-CM

## 2024-11-19 DIAGNOSIS — D50.8 OTHER IRON DEFICIENCY ANEMIA: ICD-10-CM

## 2024-11-19 DIAGNOSIS — S92.901G CLOSED FRACTURE OF RIGHT FOOT WITH DELAYED HEALING, SUBSEQUENT ENCOUNTER: ICD-10-CM

## 2024-11-19 DIAGNOSIS — M85.80 OSTEOPENIA, UNSPECIFIED LOCATION: ICD-10-CM

## 2024-11-19 DIAGNOSIS — R73.9 HYPERGLYCEMIA: ICD-10-CM

## 2024-11-19 DIAGNOSIS — M79.7 FIBROMYALGIA: ICD-10-CM

## 2024-11-19 DIAGNOSIS — G89.29 CHRONIC PAIN OF LEFT KNEE: ICD-10-CM

## 2024-11-19 DIAGNOSIS — F41.8 MIXED ANXIETY DEPRESSIVE DISORDER: ICD-10-CM

## 2024-11-19 DIAGNOSIS — I34.1 MITRAL VALVE PROLAPSE: ICD-10-CM

## 2024-11-19 DIAGNOSIS — K21.9 GASTROESOPHAGEAL REFLUX DISEASE WITHOUT ESOPHAGITIS: ICD-10-CM

## 2024-11-19 DIAGNOSIS — I25.3 ATRIAL SEPTAL ANEURYSM: ICD-10-CM

## 2024-11-19 DIAGNOSIS — N20.0 RENAL STONES: ICD-10-CM

## 2024-11-19 DIAGNOSIS — E55.9 VITAMIN D DEFICIENCY: ICD-10-CM

## 2024-11-19 DIAGNOSIS — E66.01 MORBID OBESITY: ICD-10-CM

## 2024-11-19 DIAGNOSIS — I10 PRIMARY HYPERTENSION: ICD-10-CM

## 2024-11-19 DIAGNOSIS — N93.8 DYSFUNCTIONAL UTERINE BLEEDING: ICD-10-CM

## 2024-11-19 DIAGNOSIS — R20.0 NUMBNESS OF FACE: ICD-10-CM

## 2024-11-19 DIAGNOSIS — E87.21 ACUTE METABOLIC ACIDOSIS: ICD-10-CM

## 2024-11-19 DIAGNOSIS — G56.00 CARPAL TUNNEL SYNDROME, UNSPECIFIED LATERALITY: Primary | ICD-10-CM

## 2024-11-19 DIAGNOSIS — E61.1 IRON DEFICIENCY: ICD-10-CM

## 2024-11-19 DIAGNOSIS — E87.6 HYPOKALEMIA: ICD-10-CM

## 2024-11-19 PROBLEM — E66.9 OBESITY: Status: RESOLVED | Noted: 2022-08-23 | Resolved: 2024-11-19

## 2024-11-19 PROBLEM — N92.0 MENORRHAGIA: Status: RESOLVED | Noted: 2024-05-14 | Resolved: 2024-11-19

## 2024-11-19 PROBLEM — R03.0 PREHYPERTENSION: Status: RESOLVED | Noted: 2024-05-14 | Resolved: 2024-11-19

## 2024-11-19 LAB
25(OH)D3+25(OH)D2 SERPL-MCNC: 29 NG/ML (ref 30–80)
ALBUMIN SERPL-MCNC: 4.1 G/DL (ref 3.5–5)
ALBUMIN/GLOB SERPL: 1.1 RATIO (ref 1.1–2)
ALP SERPL-CCNC: 43 UNIT/L (ref 40–150)
ALT SERPL-CCNC: 15 UNIT/L (ref 0–55)
ANION GAP SERPL CALC-SCNC: 8 MEQ/L
AST SERPL-CCNC: 15 UNIT/L (ref 5–34)
BASOPHILS # BLD AUTO: 0.03 X10(3)/MCL
BASOPHILS NFR BLD AUTO: 0.6 %
BILIRUB SERPL-MCNC: 0.5 MG/DL
BUN SERPL-MCNC: 8.4 MG/DL (ref 7–18.7)
CALCIUM SERPL-MCNC: 9.8 MG/DL (ref 8.4–10.2)
CHLORIDE SERPL-SCNC: 110 MMOL/L (ref 98–107)
CO2 SERPL-SCNC: 24 MMOL/L (ref 22–29)
CREAT SERPL-MCNC: 0.86 MG/DL (ref 0.55–1.02)
CREAT/UREA NIT SERPL: 10
EOSINOPHIL # BLD AUTO: 0.14 X10(3)/MCL (ref 0–0.9)
EOSINOPHIL NFR BLD AUTO: 2.8 %
ERYTHROCYTE [DISTWIDTH] IN BLOOD BY AUTOMATED COUNT: 13.1 % (ref 11.5–17)
GFR SERPLBLD CREATININE-BSD FMLA CKD-EPI: >60 ML/MIN/1.73/M2
GLOBULIN SER-MCNC: 3.8 GM/DL (ref 2.4–3.5)
GLUCOSE SERPL-MCNC: 93 MG/DL (ref 74–100)
HCT VFR BLD AUTO: 41 % (ref 37–47)
HGB BLD-MCNC: 13 G/DL (ref 12–16)
IMM GRANULOCYTES # BLD AUTO: 0.01 X10(3)/MCL (ref 0–0.04)
IMM GRANULOCYTES NFR BLD AUTO: 0.2 %
LYMPHOCYTES # BLD AUTO: 2.31 X10(3)/MCL (ref 0.6–4.6)
LYMPHOCYTES NFR BLD AUTO: 46 %
MCH RBC QN AUTO: 26.2 PG (ref 27–31)
MCHC RBC AUTO-ENTMCNC: 31.7 G/DL (ref 33–36)
MCV RBC AUTO: 82.5 FL (ref 80–94)
MONOCYTES # BLD AUTO: 0.33 X10(3)/MCL (ref 0.1–1.3)
MONOCYTES NFR BLD AUTO: 6.6 %
NEUTROPHILS # BLD AUTO: 2.2 X10(3)/MCL (ref 2.1–9.2)
NEUTROPHILS NFR BLD AUTO: 43.8 %
NRBC BLD AUTO-RTO: 0 %
PLATELET # BLD AUTO: 275 X10(3)/MCL (ref 130–400)
PMV BLD AUTO: 9.7 FL (ref 7.4–10.4)
POTASSIUM SERPL-SCNC: 4.1 MMOL/L (ref 3.5–5.1)
PROT SERPL-MCNC: 7.9 GM/DL (ref 6.4–8.3)
PTH-INTACT SERPL-MCNC: 73.5 PG/ML (ref 8.7–77)
RBC # BLD AUTO: 4.97 X10(6)/MCL (ref 4.2–5.4)
SODIUM SERPL-SCNC: 142 MMOL/L (ref 136–145)
TSH SERPL-ACNC: 1.06 UIU/ML (ref 0.35–4.94)
WBC # BLD AUTO: 5.02 X10(3)/MCL (ref 4.5–11.5)

## 2024-11-19 PROCEDURE — 36415 COLL VENOUS BLD VENIPUNCTURE: CPT

## 2024-11-19 PROCEDURE — 85025 COMPLETE CBC W/AUTO DIFF WBC: CPT

## 2024-11-19 PROCEDURE — 83970 ASSAY OF PARATHORMONE: CPT

## 2024-11-19 PROCEDURE — 80053 COMPREHEN METABOLIC PANEL: CPT

## 2024-11-19 PROCEDURE — 99214 OFFICE O/P EST MOD 30 MIN: CPT | Mod: PBBFAC | Performed by: INTERNAL MEDICINE

## 2024-11-19 PROCEDURE — 84443 ASSAY THYROID STIM HORMONE: CPT

## 2024-11-19 PROCEDURE — 82306 VITAMIN D 25 HYDROXY: CPT

## 2024-11-19 NOTE — PROGRESS NOTES
Subjective     Patient ID: Tara Potts is a 49 y.o. female.    Chief Complaint: Follow-up  Patient had another 5th metatarsal fracture at the base of her 5th metatarsal it who was seen in the emergency room November 10th.  She was referred to orthopedics see them.  She is in a splint on her left foot that was put in by the ER.  We will call Orthopedics in the morning expedite her seeing them my nurse will call them and if it can not be expedited she will call me.  We will order her a rocker bottom boot.  Looking back now she initially had her 1st 5th metatarsal fracture in the same area in 2021.  She had slightly low vitamin-D level at that time is subsequently been diagnosed with mild osteopenia.  She has been on Depo Provera for 13 years now for severe dysfunctional uterine bleeding her gyn changed her to medroxyprogesterone because Depo-Provera can cause osteopenia.  We will also work her up for occult hyperparathyroidism hyperthyroidism etc. we will check a repeat DEXA scan vitamin-D level PTH TSH expedite her seeing ortho get her a boot and see her in follow up in 1 week if she has not seen orthopedics by that and pending the above laboratory results we will have her see Endocrine.    She also has slight anion gap with mild metabolic acidosis with a bicarb of 19 gap of 13 and a potassium of 3.4 we will repeat this is 1 week when she returns    It is notable that in October of 2022 L1 through L4 was 0.1 left femoral neck -1.2 left total hip -0.7 right femoral neck -0.6 in right total hip 0.0 these are her T-scores on her DEXA scan it is also notable that she is having kidney stones we will see her in follow up in 1 week total time equal 50 minutes    Review of Systems   All other systems reviewed and are negative.         Objective     Physical Exam  Vitals and nursing note reviewed.   Constitutional:       Appearance: Normal appearance. She is obese.   HENT:      Head: Normocephalic and atraumatic.       Right Ear: Tympanic membrane, ear canal and external ear normal.      Left Ear: Tympanic membrane, ear canal and external ear normal.      Nose: Nose normal.      Mouth/Throat:      Mouth: Mucous membranes are dry.      Pharynx: Oropharynx is clear.   Eyes:      Extraocular Movements: Extraocular movements intact.      Conjunctiva/sclera: Conjunctivae normal.      Pupils: Pupils are equal, round, and reactive to light.   Cardiovascular:      Rate and Rhythm: Normal rate and regular rhythm.      Pulses: Normal pulses.      Heart sounds: Normal heart sounds.   Pulmonary:      Effort: Pulmonary effort is normal.      Breath sounds: Normal breath sounds.   Abdominal:      General: Bowel sounds are normal.      Palpations: Abdomen is soft.   Musculoskeletal:      Cervical back: Normal range of motion and neck supple.      Comments: Left foot in his splint   Skin:     General: Skin is warm and dry.   Neurological:      General: No focal deficit present.      Mental Status: She is alert and oriented to person, place, and time. Mental status is at baseline.   Psychiatric:         Mood and Affect: Mood normal.         Behavior: Behavior normal.         Thought Content: Thought content normal.         Judgment: Judgment normal.            Assessment and Plan     1. Carpal tunnel syndrome, unspecified laterality    2. Numbness of face    3. Mixed anxiety depressive disorder    4. Primary hypertension    5. Atrial septal aneurysm    6. Mitral valve prolapse    7. Dysfunctional uterine bleeding    8. Iron deficiency    9. Other iron deficiency anemia    10. Morbid obesity    11. Vitamin D deficiency    12. Gastroesophageal reflux disease without esophagitis    13. Osteopenia, unspecified location  -     CBC Auto Differential; Future; Expected date: 11/19/2024  -     Comprehensive Metabolic Panel; Future; Expected date: 11/19/2024  -     PTH, Intact; Future; Expected date: 11/19/2024  -     TSH; Future; Expected date:  11/19/2024  -     Vitamin D; Future; Expected date: 11/19/2024  -     Ambulatory referral/consult to Orthopedics; Future; Expected date: 11/20/2024  -     DXA Bone Density Axial Skeleton 1 or more sites; Future; Expected date: 11/20/2024    14. Fibromyalgia    15. Closed fracture of right foot with delayed healing, subsequent encounter  -     Ambulatory referral/consult to Orthopedics; Future; Expected date: 11/20/2024  -     DXA Bone Density Axial Skeleton 1 or more sites; Future; Expected date: 11/20/2024    16. Chronic pain of left knee    17. Hyperglycemia  -     Hemoglobin A1C; Future; Expected date: 11/19/2024  -     Basic Metabolic Panel; Future; Expected date: 11/26/2024    18. Hypokalemia  -     Basic Metabolic Panel; Future; Expected date: 11/26/2024        Left foot fracture, hypokalemia, metabolic acidosis, osteopenia, vitamin-D deficiency, kidney stones, dysfunctional uterine bleeding on Depo-Provera time 13 years now medroxyprogesterone.  I have spoken to our gyn colleagues about this and she feels that medroxyprogesterone should be okay rule out occult hypoparathyroidism thyroid disease other see orders         Follow up in about 1 week (around 11/26/2024).

## 2024-11-21 ENCOUNTER — HOSPITAL ENCOUNTER (OUTPATIENT)
Dept: RADIOLOGY | Facility: HOSPITAL | Age: 49
Discharge: HOME | End: 2024-11-21
Attending: NURSE PRACTITIONER

## 2024-11-21 DIAGNOSIS — Z12.31 VISIT FOR SCREENING MAMMOGRAM: ICD-10-CM

## 2024-11-21 PROCEDURE — 77063 BREAST TOMOSYNTHESIS BI: CPT | Mod: 26,,, | Performed by: RADIOLOGY

## 2024-11-21 PROCEDURE — 77067 SCR MAMMO BI INCL CAD: CPT | Mod: 26,,, | Performed by: RADIOLOGY

## 2024-11-21 PROCEDURE — 77063 BREAST TOMOSYNTHESIS BI: CPT | Mod: TC

## 2024-11-27 ENCOUNTER — HOSPITAL ENCOUNTER (OUTPATIENT)
Dept: RADIOLOGY | Facility: HOSPITAL | Age: 49
Discharge: HOME | End: 2024-11-27
Attending: ORTHOPAEDIC SURGERY

## 2024-11-27 ENCOUNTER — CLINICAL SUPPORT (OUTPATIENT)
Dept: ORTHOPEDICS | Facility: CLINIC | Age: 49
End: 2024-11-27

## 2024-11-27 VITALS
DIASTOLIC BLOOD PRESSURE: 79 MMHG | BODY MASS INDEX: 18.96 KG/M2 | HEIGHT: 66 IN | WEIGHT: 118 LBS | SYSTOLIC BLOOD PRESSURE: 122 MMHG | OXYGEN SATURATION: 100 % | HEART RATE: 84 BPM | TEMPERATURE: 99 F | RESPIRATION RATE: 20 BRPM

## 2024-11-27 DIAGNOSIS — M79.672 LEFT FOOT PAIN: ICD-10-CM

## 2024-11-27 DIAGNOSIS — S92.355A CLOSED NONDISPLACED FRACTURE OF FIFTH METATARSAL BONE OF LEFT FOOT, INITIAL ENCOUNTER: ICD-10-CM

## 2024-11-27 PROCEDURE — 99214 OFFICE O/P EST MOD 30 MIN: CPT | Mod: PBBFAC,25

## 2024-11-27 PROCEDURE — 73630 X-RAY EXAM OF FOOT: CPT | Mod: TC,LT

## 2024-11-27 NOTE — PROGRESS NOTES
[facility]  Established Patient Office Visit  2024       Patient ID: Tara Potts  YOB: 1975  MRN: [id number]    Chief Complaint: Injury and Pain of the Left Foot (Injured left foot on 2024, splint intact, pain /10   )    HPI:  Tara Potts is a 49 y.o. female who sustained a left foot injury while at work on 2024.  She reports she was pushing carts and tripped on an incline and had immediate pain and swelling to the lateral aspect of her left foot.  She had difficulty ambulating after the injury.  She was seen in the ER the next day and diagnosed with a left 5th metatarsal fracture.  She has been nonweightbearing in his splint since the injury.  She denies any numbness or tingling.  She does report a previous history of a left 5th metatarsal fracture that was treated nonoperatively (did not require period of nonweightbearing).     12 point ROS performed and negative except as above.     Past Medical History:    Past Medical History:   Diagnosis Date    Abnormal Pap smear of cervix     Breast disorder     Hypertension     Osteopenia      Past Surgical History:   Procedure Laterality Date     SECTION      COLONOSCOPY  2020    Milad Wynn MD    CRYOTHERAPY      TUBAL LIGATION       Family History   Problem Relation Name Age of Onset    Colon cancer Maternal Grandmother Vianney Galvez     Cancer Maternal Grandmother Vianney Galvez     Heart disease Father Thiago Thurman     Alcohol abuse Father Thiago Thurman     Early death Father Thiago Thurman     Hypertension Mother Alpa Potts     Diabetes Mother Alpa Potts     Arthritis Mother Alpa Potts     Hypertension Sister Stephani Caffery     Cancer Maternal Grandfather Parviz North     Cancer Maternal Uncle See Quesada Mary Anne     Drug abuse Son Wu Thurman      Social History     Socioeconomic History    Marital status:    Tobacco Use     Smoking status: Never     Passive exposure: Never    Smokeless tobacco: Never   Substance and Sexual Activity    Alcohol use: Not Currently     Comment: socially     Drug use: Never    Sexual activity: Not Currently     Partners: Male     Birth control/protection: Abstinence, Injection, See Surgical Hx     Social Drivers of Health     Financial Resource Strain: Medium Risk (11/14/2024)    Overall Financial Resource Strain (CARDIA)     Difficulty of Paying Living Expenses: Somewhat hard   Food Insecurity: Food Insecurity Present (11/14/2024)    Hunger Vital Sign     Worried About Running Out of Food in the Last Year: Sometimes true     Ran Out of Food in the Last Year: Sometimes true   Transportation Needs: No Transportation Needs (5/14/2024)    TRANSPORTATION NEEDS     Transportation : No   Physical Activity: Insufficiently Active (11/14/2024)    Exercise Vital Sign     Days of Exercise per Week: 2 days     Minutes of Exercise per Session: 30 min   Stress: No Stress Concern Present (11/14/2024)    [employer] [place] [employer] - Occupational Stress Questionnaire     Feeling of Stress : Only a little   Housing Stability: High Risk (11/14/2024)    Housing Stability Vital Sign     Unable to Pay for Housing in the Last Year: Yes     Homeless in the Last Year: No     Medication List with Changes/Refills   Current Medications    AMITRIPTYLINE (ELAVIL) 25 MG TABLET    TAKE 1 TABLET BY MOUTH NIGHTLY AS NEEDED FOR INSOMNIA    AMLODIPINE (NORVASC) 10 MG TABLET    Take 1 tablet (10 mg total) by mouth once daily.    GABAPENTIN (NEURONTIN) 300 MG CAPSULE    Take 1 capsule (300 mg total) by mouth 3 (three) times daily.    HYDROCODONE-ACETAMINOPHEN (NORCO) 5-325 MG PER TABLET    Take 1 tablet by mouth every 6 (six) hours as needed for Pain.    IBUPROFEN (ADVIL,MOTRIN) 800 MG TABLET    Take 1 tablet (800 mg total) by mouth 3 (three) times daily as needed for Pain.    MEDROXYPROGESTERONE (PROVERA) 10 MG TABLET    Take 1  tablet (10 mg total) by mouth once daily. Take daily as prescribed.     Review of patient's allergies indicates:   Allergen Reactions    Sulfa (sulfonamide antibiotics) Hives, Itching, Nausea Only, Palpitations, Rash and Shortness Of Breath       Physical Exam:  Left Lower extremity:  No gross deformity, open wounds, abrasions  Mild swelling about the lateral aspect of the foot  Mild TTP over lateral aspect of proximal   5th metatarsal  Motor intact: EHL/FHL/TA/GSC  SILT: DP/SP/T/[place]/Sa  Full ROM of hip, knee, ankle  Palpable DP pulse    Imaging independently interpreted:  X-ray of left foot demonstrates a zone 1 proximal 5th metatarsal fracture    Assessment and Plan:    Tara Potts is a 49 y.o. female with a zone 1 5th proximal phalanx fracture.    -had a long discussion with the patient regarding conservative versus surgical management of her injury.  Discussed that she is a very distal zone 1 fracture which may be an increased risk for nonunion with conservative management  - patient would like to avoid surgery if at all possible  - WBAT in CAM boot   - follow up in 3 weeks for repeat x-ray; we will assess fracture healing at that time.  If no evidence of healing we will discuss nonweightbearing trial in a cast versus surgical intervention    Celina Vargas MD PGY-3  [employer] Orthopaedic Surgery           Orders Placed This Encounter    HME - OTHER    X-Ray Foot Complete Left

## 2024-12-03 ENCOUNTER — HOSPITAL ENCOUNTER (OUTPATIENT)
Dept: RADIOLOGY | Facility: HOSPITAL | Age: 49
Discharge: HOME | End: 2024-12-03
Attending: INTERNAL MEDICINE

## 2024-12-03 DIAGNOSIS — M85.80 OSTEOPENIA, UNSPECIFIED LOCATION: ICD-10-CM

## 2024-12-03 DIAGNOSIS — S92.901G CLOSED FRACTURE OF RIGHT FOOT WITH DELAYED HEALING, SUBSEQUENT ENCOUNTER: ICD-10-CM

## 2024-12-03 PROCEDURE — 77080 DXA BONE DENSITY AXIAL: CPT | Mod: TC

## 2024-12-16 ENCOUNTER — TELEPHONE (OUTPATIENT)
Dept: INTERNAL MEDICINE | Facility: CLINIC | Age: 49
End: 2024-12-16
Payer: COMMERCIAL

## 2024-12-16 NOTE — TELEPHONE ENCOUNTER
----- Message from Gary Gallo sent at 12/16/2024  9:30 AM CST -----  Called to schedule pt for reclast she declined at this time. She is asking to be seen by provider before starting so she can get more education on the drug.

## 2024-12-18 ENCOUNTER — OFFICE VISIT (OUTPATIENT)
Dept: ORTHOPEDICS | Facility: CLINIC | Age: 49
End: 2024-12-18
Payer: COMMERCIAL

## 2024-12-18 ENCOUNTER — HOSPITAL ENCOUNTER (OUTPATIENT)
Dept: RADIOLOGY | Facility: HOSPITAL | Age: 49
Discharge: HOME | End: 2024-12-18
Attending: ORTHOPAEDIC SURGERY

## 2024-12-18 VITALS
WEIGHT: 265 LBS | HEIGHT: 65 IN | SYSTOLIC BLOOD PRESSURE: 111 MMHG | DIASTOLIC BLOOD PRESSURE: 75 MMHG | HEART RATE: 81 BPM | BODY MASS INDEX: 44.15 KG/M2 | TEMPERATURE: 98 F

## 2024-12-18 DIAGNOSIS — S92.355A CLOSED NONDISPLACED FRACTURE OF FIFTH METATARSAL BONE OF LEFT FOOT, INITIAL ENCOUNTER: Primary | ICD-10-CM

## 2024-12-18 DIAGNOSIS — S92.355A CLOSED NONDISPLACED FRACTURE OF FIFTH METATARSAL BONE OF LEFT FOOT, INITIAL ENCOUNTER: ICD-10-CM

## 2024-12-18 PROCEDURE — 73630 X-RAY EXAM OF FOOT: CPT | Mod: TC,LT

## 2024-12-18 PROCEDURE — 99213 OFFICE O/P EST LOW 20 MIN: CPT | Mod: PBBFAC,25

## 2024-12-18 NOTE — LETTER
December 18, 2024      [facility]  [address] [place] [id number]  Phone: [phone]       Patient: Tara Potts   YOB: 1975  Date of Visit: 12/18/2024    To Whom It May Concern:    Dhara Potts  was at [facility] on 12/18/2024. The patient may return to work/school on 12/18/24 with no restrictions. If you have any questions or concerns, or if I can be of further assistance, please do not hesitate to contact me.    Sincerely,    Aissatou Jefferson LPN

## 2024-12-18 NOTE — PROGRESS NOTES
[facility]  Established Patient Office Visit  2024       Patient ID: Tara Potts  YOB: 1975  MRN: [id number]    Chief Complaint: Follow-up of the Left Foot (Left foot follow up doing good pain is being controlled taking otc as needed wearing walking boot)    HPI:  Tara Potts is a 49 y.o. female who sustained a left foot injury while at work on 2024.  She reports she was pushing carts and tripped on an incline and had immediate pain and swelling to the lateral aspect of her left foot.  She had difficulty ambulating after the injury.  She was seen in the ER the next day and diagnosed with a left 5th metatarsal fracture.  She has been nonweightbearing in his splint since the injury.  She denies any numbness or tingling.  She does report a previous history of a left 5th metatarsal fracture that was treated nonoperatively (did not require period of nonweightbearing).       Interval: Today she presents for re-evaluation.  At last clinic visit decision was made to proceed with nonoperative management.  She has been weight-bearing as tolerated in the Cam boot.  She reports her pain is overall significantly improved.  She denies any interval falls or trauma.  12 point ROS performed and negative except as above.     Past Medical History:    Past Medical History:   Diagnosis Date    Abnormal Pap smear of cervix     Breast disorder     Hypertension     Osteopenia      Past Surgical History:   Procedure Laterality Date     SECTION      COLONOSCOPY  2020    Milad Wynn MD    CRYOTHERAPY      TUBAL LIGATION       Family History   Problem Relation Name Age of Onset    Colon cancer Maternal Grandmother Vianney Galvez     Cancer Maternal Grandmother Vianney Galvez     Heart disease Father Thiago Thurman     Alcohol abuse Father Thiago Thurman     Early death Father Thiago Thurman     Hypertension Mother Alpa Potts     Diabetes Mother  Alpa Potts     Arthritis Mother Alpa Potts     Hypertension Sister Stephani Galindo     Cancer Maternal Grandfather Parviz North     Cancer Maternal Uncle See North     Drug abuse Son Wu Thurman      Social History     Socioeconomic History    Marital status:    Tobacco Use    Smoking status: Never     Passive exposure: Never    Smokeless tobacco: Never   Substance and Sexual Activity    Alcohol use: Not Currently     Comment: socially     Drug use: Never    Sexual activity: Not Currently     Partners: Male     Birth control/protection: Abstinence, Injection, See Surgical Hx     Social Drivers of Health     Financial Resource Strain: Medium Risk (11/14/2024)    Overall Financial Resource Strain (CARDIA)     Difficulty of Paying Living Expenses: Somewhat hard   Food Insecurity: Food Insecurity Present (11/14/2024)    Hunger Vital Sign     Worried About Running Out of Food in the Last Year: Sometimes true     Ran Out of Food in the Last Year: Sometimes true   Transportation Needs: No Transportation Needs (5/14/2024)    TRANSPORTATION NEEDS     Transportation : No   Physical Activity: Insufficiently Active (11/14/2024)    Exercise Vital Sign     Days of Exercise per Week: 2 days     Minutes of Exercise per Session: 30 min   Stress: No Stress Concern Present (11/14/2024)    [employer] [place] [employer] - Occupational Stress Questionnaire     Feeling of Stress : Only a little   Housing Stability: High Risk (11/14/2024)    Housing Stability Vital Sign     Unable to Pay for Housing in the Last Year: Yes     Homeless in the Last Year: No     Medication List with Changes/Refills   Current Medications    AMITRIPTYLINE (ELAVIL) 25 MG TABLET    TAKE 1 TABLET BY MOUTH NIGHTLY AS NEEDED FOR INSOMNIA    AMLODIPINE (NORVASC) 10 MG TABLET    Take 1 tablet (10 mg total) by mouth once daily.    GABAPENTIN (NEURONTIN) 300 MG CAPSULE    Take 1 capsule (300 mg total) by mouth 3  (three) times daily.    HYDROCODONE-ACETAMINOPHEN (NORCO) 5-325 MG PER TABLET    Take 1 tablet by mouth every 6 (six) hours as needed for Pain.    IBUPROFEN (ADVIL,MOTRIN) 800 MG TABLET    Take 1 tablet (800 mg total) by mouth 3 (three) times daily as needed for Pain.    MEDROXYPROGESTERONE (PROVERA) 10 MG TABLET    Take 1 tablet (10 mg total) by mouth once daily. Take daily as prescribed.     Review of patient's allergies indicates:   Allergen Reactions    Sulfa (sulfonamide antibiotics) Hives, Itching, Nausea Only, Palpitations, Rash and Shortness Of Breath       Physical Exam:  Left Lower extremity:  No gross deformity, open wounds, abrasions  Mild swelling about the lateral aspect of the foot  Mild TTP over lateral aspect of proximal   5th metatarsal  Motor intact: EHL/FHL/TA/GSC  SILT: DP/SP/T/[place]/Sa  Full ROM of hip, knee, ankle  Palpable DP pulse    Imaging independently interpreted:  X-ray of left foot demonstrates a zone 1 proximal 5th metatarsal fracture with interval callus formation at fracture site.  No interval displacement    Assessment and Plan:    Tara Potts is a 49 y.o. female with a zone 1 5th proximal phalanx pseudo [place]aiza fracture.    -given that she shows evidence of fracture healing we will continue with nonoperative management  - WBAT in CAM boot   - follow up in 4 weeks for repeat x-ray; we will plan to discontinue Cam boot at that time    Celina Vargas MD PGY-3  [employer] Orthopaedic Surgery           Orders Placed This Encounter    X-Ray Foot Complete Left

## 2024-12-19 NOTE — PROGRESS NOTES
Faculty Attestation: Tara Potts  was seen at [facility] in the Orthopaedic Clinic. Patient seen and evaluated at the time of the visit. History of Present Illness, Physical Exam, and Assessment and Plan reviewed. Treatment plan is reasonable and appropriate. Compliance with treatment recommendations is important. No procedure was performed.     Ryan Dias MD  Orthopaedic Surgery

## 2024-12-29 DIAGNOSIS — M79.7 FIBROMYALGIA: ICD-10-CM

## 2025-01-02 DIAGNOSIS — M79.7 FIBROMYALGIA: ICD-10-CM

## 2025-01-02 DIAGNOSIS — I10 PRIMARY HYPERTENSION: ICD-10-CM

## 2025-01-02 RX ORDER — AMLODIPINE BESYLATE 10 MG/1
10 TABLET ORAL DAILY
Qty: 30 TABLET | Refills: 11 | Status: SHIPPED | OUTPATIENT
Start: 2025-01-02 | End: 2026-01-02

## 2025-01-02 RX ORDER — GABAPENTIN 300 MG/1
300 CAPSULE ORAL 3 TIMES DAILY
Qty: 90 CAPSULE | Refills: 0 | Status: SHIPPED | OUTPATIENT
Start: 2025-01-02 | End: 2025-01-02 | Stop reason: SDUPTHER

## 2025-01-02 RX ORDER — GABAPENTIN 300 MG/1
300 CAPSULE ORAL 3 TIMES DAILY
Qty: 90 CAPSULE | Refills: 0 | Status: SHIPPED | OUTPATIENT
Start: 2025-01-02

## 2025-01-07 DIAGNOSIS — Z12.11 COLON CANCER SCREENING: Primary | ICD-10-CM

## 2025-01-07 RX ORDER — POLYETHYLENE GLYCOL 3350, SODIUM SULFATE, SODIUM CHLORIDE, POTASSIUM CHLORIDE, SODIUM ASCORBATE, AND ASCORBIC ACID 7.5-2.691G
KIT ORAL
Qty: 1 KIT | Refills: 0 | Status: SHIPPED | OUTPATIENT
Start: 2025-01-07

## 2025-02-12 ENCOUNTER — ANESTHESIA EVENT (OUTPATIENT)
Dept: ENDOSCOPY | Facility: HOSPITAL | Age: 50
End: 2025-02-12

## 2025-02-12 RX ORDER — LIDOCAINE HYDROCHLORIDE 10 MG/ML
1 INJECTION, SOLUTION EPIDURAL; INFILTRATION; INTRACAUDAL; PERINEURAL ONCE
OUTPATIENT
Start: 2025-02-12 | End: 2025-02-12

## 2025-02-12 RX ORDER — SODIUM CHLORIDE, SODIUM LACTATE, POTASSIUM CHLORIDE, CALCIUM CHLORIDE 600; 310; 30; 20 MG/100ML; MG/100ML; MG/100ML; MG/100ML
INJECTION, SOLUTION INTRAVENOUS CONTINUOUS
OUTPATIENT
Start: 2025-02-12

## 2025-02-12 NOTE — ANESTHESIA PREPROCEDURE EVALUATION
02/12/2025  Tara Potts is a 49 y.o., female with PMHx of morbid obesity, HTN, MVP, GERD, fibromyalgia, anxiety/depression presents for screening colonoscopy.    NO BETA BLOCKER USE    Active Ambulatory Problems     Diagnosis Date Noted    Dysfunctional uterine bleeding 08/23/2022    Anemia 08/23/2022    Morbid obesity 08/23/2022    Plantar fasciitis 08/23/2022    Hypertension 08/23/2022    Foot fracture 08/23/2022    Vitamin D deficiency 12/13/2022    Osteopenia 12/13/2022    Iron deficiency 12/13/2022    Fibromyalgia 05/14/2024    Chronic pain of left knee 05/14/2024    Atrial septal aneurysm 05/14/2024    Carpal tunnel syndrome 05/14/2024    Gastroesophageal reflux disease 05/16/2023    Mitral valve prolapse 05/16/2023    Mixed anxiety depressive disorder 05/16/2023    Numbness of face 05/14/2024    Acute metabolic acidosis 11/19/2024    Hypokalemia 11/19/2024    Renal stones 11/19/2024     Resolved Ambulatory Problems     Diagnosis Date Noted    Obesity 08/23/2022    Wellness examination 08/23/2022    Pneumonia due to COVID-19 virus 08/23/2022    Menorrhagia 05/14/2024    Prehypertension 05/14/2024     Past Medical History:   Diagnosis Date    Abnormal Pap smear of cervix     Breast disorder        Pre-op Assessment    I have reviewed the NPO Status.      Review of Systems  Anesthesia Hx:  No problems with previous Anesthesia                Social:  Non-Smoker       Cardiovascular:     Hypertension, well controlled Valvular problems/Murmurs, MVP                                         Pulmonary:  Pulmonary Normal                       Renal/:  Renal/ Normal                 Hepatic/GI:  Bowel Prep.   GERD, well controlled                Neurological:  Neurology Normal                                      Endocrine:        Morbid Obesity / BMI > 40  Psych:   anxiety depression              Vitals:     "01/30/25 1158 02/13/25 0701   BP:  (!) 126/97   BP Location:  Left arm   Patient Position:  Lying   Pulse:  83   Resp:  16   Temp:  36.7 °C (98.1 °F)   TempSrc:  Oral   SpO2:  98%   Weight: 117.9 kg (260 lb) 116.1 kg (256 lb)   Height: 5' 5" (1.651 m) 5' 6" (1.676 m)         Physical Exam  General: Alert, Cooperative and Well nourished    Airway:  Mallampati: II   Mouth Opening: Normal  TM Distance: Normal  Tongue: Normal  Neck ROM: Normal ROM    Dental:  Intact    Chest/Lungs:  Clear to auscultation, Normal Respiratory Rate    Heart:  Rate: Normal  Rhythm: Regular Rhythm  Sounds: Normal       Latest Reference Range & Units 02/13/25 07:01   hCG Qualitative, Urine Negative  Negative     Lab Results   Component Value Date    WBC 5.02 11/19/2024    HGB 13.0 11/19/2024    HCT 41.0 11/19/2024    MCV 82.5 11/19/2024     11/19/2024       CMP  Sodium   Date Value Ref Range Status   11/19/2024 142 136 - 145 mmol/L Final     Potassium   Date Value Ref Range Status   11/19/2024 4.1 3.5 - 5.1 mmol/L Final     Chloride   Date Value Ref Range Status   11/19/2024 110 (H) 98 - 107 mmol/L Final     CO2   Date Value Ref Range Status   11/19/2024 24 22 - 29 mmol/L Final     Blood Urea Nitrogen   Date Value Ref Range Status   11/19/2024 8.4 7.0 - 18.7 mg/dL Final     Creatinine   Date Value Ref Range Status   11/19/2024 0.86 0.55 - 1.02 mg/dL Final     Calcium   Date Value Ref Range Status   11/19/2024 9.8 8.4 - 10.2 mg/dL Final     Albumin   Date Value Ref Range Status   11/19/2024 4.1 3.5 - 5.0 g/dL Final     Bilirubin Total   Date Value Ref Range Status   11/19/2024 0.5 <=1.5 mg/dL Final     ALP   Date Value Ref Range Status   11/19/2024 43 40 - 150 unit/L Final     AST   Date Value Ref Range Status   11/19/2024 15 5 - 34 unit/L Final     ALT   Date Value Ref Range Status   11/19/2024 15 0 - 55 unit/L Final     eGFR   Date Value Ref Range Status   11/19/2024 >60 mL/min/1.73/m2 Final           Anesthesia Plan  Type of " Anesthesia, risks & benefits discussed:    Anesthesia Type: Gen Natural Airway  Intra-op Monitoring Plan: Standard ASA Monitors  Post Op Pain Control Plan: IV/PO Opioids PRN  Induction:  IV  Informed Consent: Informed consent signed with the Patient and all parties understand the risks and agree with anesthesia plan.  All questions answered.   ASA Score: 2  Day of Surgery Review of History & Physical: H&P Update referred to the surgeon/provider.    Ready For Surgery From Anesthesia Perspective.     .

## 2025-02-13 ENCOUNTER — ANESTHESIA (OUTPATIENT)
Dept: ENDOSCOPY | Facility: HOSPITAL | Age: 50
End: 2025-02-13

## 2025-02-13 ENCOUNTER — HOSPITAL ENCOUNTER (OUTPATIENT)
Facility: HOSPITAL | Age: 50
Discharge: HOME | End: 2025-02-13
Attending: INTERNAL MEDICINE | Admitting: INTERNAL MEDICINE

## 2025-02-13 DIAGNOSIS — K57.30 DIVERTICULOSIS LARGE INTESTINE W/O PERFORATION OR ABSCESS W/O BLEEDING: Primary | ICD-10-CM

## 2025-02-13 LAB
B-HCG UR QL: NEGATIVE
CTP QC/QA: YES

## 2025-02-13 PROCEDURE — G0105 COLORECTAL SCRN; HI RISK IND: HCPCS | Performed by: INTERNAL MEDICINE

## 2025-02-13 PROCEDURE — 81025 URINE PREGNANCY TEST: CPT | Performed by: ANESTHESIOLOGY

## 2025-02-13 PROCEDURE — 63600175 PHARM REV CODE 636 W HCPCS: Performed by: NURSE ANESTHETIST, CERTIFIED REGISTERED

## 2025-02-13 PROCEDURE — 37000009 HC ANESTHESIA EA ADD 15 MINS: Performed by: INTERNAL MEDICINE

## 2025-02-13 PROCEDURE — 37000008 HC ANESTHESIA 1ST 15 MINUTES: Performed by: INTERNAL MEDICINE

## 2025-02-13 RX ORDER — LIDOCAINE HYDROCHLORIDE 20 MG/ML
INJECTION, SOLUTION EPIDURAL; INFILTRATION; INTRACAUDAL; PERINEURAL
Status: DISCONTINUED | OUTPATIENT
Start: 2025-02-13 | End: 2025-02-13

## 2025-02-13 RX ORDER — PROPOFOL 10 MG/ML
VIAL (ML) INTRAVENOUS
Status: DISCONTINUED | OUTPATIENT
Start: 2025-02-13 | End: 2025-02-13

## 2025-02-13 RX ADMIN — PROPOFOL 20 MG: 10 INJECTION, EMULSION INTRAVENOUS at 08:02

## 2025-02-13 RX ADMIN — PROPOFOL 30 MG: 10 INJECTION, EMULSION INTRAVENOUS at 08:02

## 2025-02-13 RX ADMIN — PROPOFOL 120 MG: 10 INJECTION, EMULSION INTRAVENOUS at 08:02

## 2025-02-13 RX ADMIN — LIDOCAINE HYDROCHLORIDE 100 MG: 20 INJECTION, SOLUTION EPIDURAL; INFILTRATION; INTRACAUDAL; PERINEURAL at 08:02

## 2025-02-13 RX ADMIN — PROPOFOL 40 MG: 10 INJECTION, EMULSION INTRAVENOUS at 08:02

## 2025-02-13 NOTE — DISCHARGE SUMMARY
[facility]  Discharge Note  Short Stay    Procedure(s) (LRB):  COLONOSCOPY (N/A)  Procedure of colonoscopy was explained to the patient and consent obtained.  The patient was transferred to the endoscopy suite, general IV anesthesia was provided by anesthesia Services.  Rectal exam was performed and normal.  The Olympus videocolonoscope was introduced per rectum and advanced around the colon to the cecum.  The ileocecal valve and appendiceal orifice were identified and normal, the distal terminal ileum inspected and noted to be normal.  Careful examination of the ascending, transverse and descending colon revealed no abnormalities.  There were few scattered small diverticula noted in the sigmoid colon.  The rectum was normal including retroflexed view.  The endoscope was withdrawn, withdrawal time cecum to rectum 14 minutes.  The procedure was well tolerated and the patient returned to the recovery area for observation.      Discharge plan-patient will resume a regular diet today and normal activities tomorrow.  Based on her personal and family history a follow-up colonoscopy in 5 years is recommended.    OUTCOME: Patient tolerated treatment/procedure well without complication and is now ready for discharge.    DISPOSITION: Home or Self Care    FINAL DIAGNOSIS:  <principal problem not specified>    FOLLOWUP: With primary care provider    DISCHARGE INSTRUCTIONS:    Discharge Procedure Orders   Diet general     Call MD for:  temperature >100.4     Call MD for:  persistent nausea and vomiting     Call MD for:  severe uncontrolled pain     Call MD for:  difficulty breathing, headache or visual disturbances     Activity as tolerated         Clinical Reference Documents Added to Patient Instructions         Document    COLONOSCOPY DISCHARGE INSTRUCTIONS (ENGLISH)            TIME SPENT ON DISCHARGE: 5 minutes

## 2025-02-13 NOTE — PROCEDURES
"Tara Potts is a 49 y.o. female patient.    Temp: 98.1 °F (36.7 °C) (02/13/25 0701)  Pulse: 68 (02/13/25 0927)  Resp: 18 (02/13/25 0927)  BP: 117/85 (02/13/25 0927)  SpO2: 100 % (02/13/25 0927)  Weight: 116.1 kg (256 lb) (02/13/25 0701)  Height: 5' 6" (167.6 cm) (02/13/25 0701)       Procedures    2/13/2025    "

## 2025-02-13 NOTE — PROVATION PATIENT INSTRUCTIONS
Discharge Summary/Instructions after an Endoscopic Procedure  Patient Name: Tara Potts  Patient MRN: [id number]  Patient YOB: 1975  Thursday, February 13, 2025  Low Fairchild MD  Dear patient,  As a result of recent federal legislation (The Federal Cures Act), you may   receive lab or pathology results from your procedure in your [facility]   account before your physician is able to contact you. Your physician or   their representative will relay the results to you with their   recommendations at their soonest availability.  Thank you,  RESTRICTIONS:  During your procedure today, you received medications for sedation.  These   medications may affect your judgment, balance and coordination.  Therefore,   for 24 hours, you have the following restrictions:   - DO NOT drive a car, operate machinery, make legal/financial decisions,   sign important papers or drink alcohol.    ACTIVITY:  Today: no heavy lifting, straining or running due to procedural   sedation/anesthesia.  The following day: return to full activity including work.  DIET:  Eat and drink normally unless instructed otherwise.     TREATMENT FOR COMMON SIDE EFFECTS:  - Mild abdominal pain, nausea, belching, bloating or excessive gas:  rest,   eat lightly and use a heating pad.  - Sore Throat: treat with throat lozenges and/or gargle with warm salt   water.  - Because air was used during the procedure, expelling large amounts of air   from your rectum or belching is normal.  - If a bowel prep was taken, you may not have a bowel movement for 1-3 days.    This is normal.  SYMPTOMS TO WATCH FOR AND REPORT TO YOUR PHYSICIAN:  1. Abdominal pain or bloating, other than gas cramps.  2. Chest pain.  3. Back pain.  4. Signs of infection such as: chills or fever occurring within 24 hours   after the procedure.  5. Rectal bleeding, which would show as bright red, maroon, or black stools.   (A tablespoon of blood from the rectum is not serious,  especially if   hemorrhoids are present.)  6. Vomiting.  7. Weakness or dizziness.  GO DIRECTLY TO THE NEAREST EMERGENCY ROOM IF YOU HAVE ANY OF THE FOLLOWING:      Difficulty breathing              Chills and/or fever over 101 F   Persistent vomiting and/or vomiting blood   Severe abdominal pain   Severe chest pain   Black, tarry stools   Bleeding- more than one tablespoon   Any other symptom or condition that you feel may need urgent attention  Your doctor recommends these additional instructions:  If any biopsies were taken, your doctors clinic will contact you in 1 to 2   weeks with any results.  Recommendations:  - Discharge patient to home (ambulatory).   - Resume previous diet today.      - Repeat colonoscopy in 5 years for surveillance.   - Continue present medications.  Impressions:  - Diverticulosis in the sigmoid colon.   - The examination was otherwise normal.   - No specimens collected.  For questions, problems or results please call your physician - Low Fairchild MD at Work:  [phone].  [facility] , EMERGENCY ROOM PHONE NUMBER: [phone]  IF A COMPLICATION OR EMERGENCY SITUATION ARISES AND YOU ARE UNABLE TO REACH   YOUR PHYSICIAN - GO DIRECTLY TO THE EMERGENCY ROOM.  MD Low Benson MD  2/13/2025 9:06:06 AM  This report has been verified and signed electronically.  Dear patient,  As a result of recent federal legislation (The Federal Cures Act), you may   receive lab or pathology results from your procedure in your [facility]   account before your physician is able to contact you. Your physician or   their representative will relay the results to you with their   recommendations at their soonest availability.  Thank you,  PROVATION

## 2025-02-13 NOTE — ANESTHESIA POSTPROCEDURE EVALUATION
Anesthesia Post Evaluation    Patient: Tara Potts    Procedure(s) Performed: Procedure(s) (LRB):  COLONOSCOPY (N/A)    Final Anesthesia Type: general      Patient location during evaluation: GI PACU  Patient participation: Yes- Able to Participate  Level of consciousness: awake and alert  Post-procedure vital signs: reviewed and stable  Pain management: adequate  Airway patency: patent    PONV status at discharge: No PONV  Anesthetic complications: no      Cardiovascular status: hemodynamically stable  Respiratory status: spontaneous ventilation  Hydration status: euvolemic  Follow-up not needed.              Vitals Value Taken Time   /97 02/13/25 0701   Temp 36.7 °C (98.1 °F) 02/13/25 0701   Pulse 83 02/13/25 0701   Resp 16 02/13/25 0701   SpO2 98 % 02/13/25 0701         No case tracking events are documented in the log.      Pain/Justin Score: No data recorded

## 2025-02-13 NOTE — H&P
Colonoscopy History and Physical    Patient Name: Tara Potts  MRN: [id number]  : 1975  Date of Procedure:  2025  Referring Physician: Delvin Connolly MD  Primary Physician: Delvin Connolly MD  Procedure Physician: Low Fairchild MD     Procedure - Colonoscopy  ASA - per anesthesia  Mallampati - per anesthesia  History of Anesthesia problems - no  Family history Anesthesia problems -  no   Plan of anesthesia - General    Diagnosis: previous adenomatous polyp  Chief Complaint: Same as above    HPI: Patient is an 49 y.o. female is here for the above.     Last colonoscopy:  5 years ago   Family history:  Grandmother had colon cancer, mother had colon polyps  Anticoagulation:  No    ROS:  Constitutional: No fevers, chills, No weight loss  CV: No chest pain  Pulm: No cough, No shortness of breath  GI: see HPI    Medical History:   Past Medical History:   Diagnosis Date    Abnormal Pap smear of cervix     Breast disorder     Hypertension     Osteopenia          Surgical History:   Past Surgical History:   Procedure Laterality Date     SECTION      COLONOSCOPY  2020    Milad Wynn MD    CRYOTHERAPY      ESOPHAGOGASTRODUODENOSCOPY      TUBAL LIGATION         Family History:   Family History   Problem Relation Name Age of Onset    Hypertension Mother Alpa Potts     Diabetes Mother Alpa Potts     Arthritis Mother Alpa Potts     Heart disease Father Thiago Thurman     Alcohol abuse Father Thaigo Thurman     Early death Father Thiago Thurman     Heart failure Father Thiago Thurman     Hypertension Sister Stephani Caffery     Drug abuse Son Wu Thurman     Colon cancer Maternal Uncle See North     Colon cancer Maternal Grandmother Vianney Galvez     Cancer Maternal Grandmother Vianney Galvez     Cancer Maternal Grandfather Parviz North     Stomach cancer Maternal Grandfather Parviz North    .    Social History:   Social History      Socioeconomic History    Marital status:    Tobacco Use    Smoking status: Never     Passive exposure: Never    Smokeless tobacco: Never   Substance and Sexual Activity    Alcohol use: Not Currently     Comment: socially     Drug use: Never    Sexual activity: Yes     Partners: Male     Birth control/protection: Injection, See Surgical Hx     Social Drivers of Health     Financial Resource Strain: Medium Risk (11/14/2024)    Overall Financial Resource Strain (CARDIA)     Difficulty of Paying Living Expenses: Somewhat hard   Food Insecurity: Food Insecurity Present (11/14/2024)    Hunger Vital Sign     Worried About Running Out of Food in the Last Year: Sometimes true     Ran Out of Food in the Last Year: Sometimes true   Transportation Needs: No Transportation Needs (5/14/2024)    TRANSPORTATION NEEDS     Transportation : No   Physical Activity: Insufficiently Active (11/14/2024)    Exercise Vital Sign     Days of Exercise per Week: 2 days     Minutes of Exercise per Session: 30 min   Stress: No Stress Concern Present (11/14/2024)    [employer] [place] [employer] - Occupational Stress Questionnaire     Feeling of Stress : Only a little   Housing Stability: High Risk (11/14/2024)    Housing Stability Vital Sign     Unable to Pay for Housing in the Last Year: Yes     Homeless in the Last Year: No       Review of patient's allergies indicates:   Allergen Reactions    Sulfa (sulfonamide antibiotics) Hives, Itching, Nausea Only, Palpitations, Rash and Shortness Of Breath       Medications:   Medications Prior to Admission   Medication Sig Dispense Refill Last Dose/Taking    amLODIPine (NORVASC) 10 MG tablet Take 1 tablet (10 mg total) by mouth once daily. 30 tablet 11 2/12/2025    gabapentin (NEURONTIN) 300 MG capsule Take 1 capsule (300 mg total) by mouth 3 (three) times daily. 90 capsule 0 2/12/2025    medroxyPROGESTERone (PROVERA) 10 MG tablet Take 1 tablet (10 mg total) by mouth once daily.  "Take daily as prescribed. 30 tablet 12 2/12/2025    polyethylene glycol (MOVIPREP) 100-7.5-2.691 gram solution Take as directed prior to colonoscopy 1 kit 0 2/12/2025    amitriptyline (ELAVIL) 25 MG tablet TAKE 1 TABLET BY MOUTH NIGHTLY AS NEEDED FOR INSOMNIA (Patient not taking: Reported on 1/30/2025) 30 tablet 0 Not Taking    HYDROcodone-acetaminophen (NORCO) 5-325 mg per tablet Take 1 tablet by mouth every 6 (six) hours as needed for Pain. (Patient not taking: Reported on 12/18/2024) 15 tablet 0     ibuprofen (ADVIL,MOTRIN) 800 MG tablet Take 1 tablet (800 mg total) by mouth 3 (three) times daily as needed for Pain. (Patient not taking: Reported on 1/30/2025) 30 tablet 0 Not Taking         Physical Exam:    Vital Signs:   Vitals:    02/13/25 0701   BP: (!) 126/97   Pulse: 83   Resp: 16   Temp: 98.1 °F (36.7 °C)     BP (!) 126/97 (BP Location: Left arm, Patient Position: Lying)   Pulse 83   Temp 98.1 °F (36.7 °C) (Oral)   Resp 16   Ht 5' 6" (1.676 m)   Wt 116.1 kg (256 lb)   SpO2 98%   Breastfeeding No   BMI 41.32 kg/m²     General:          Well appearing in no acute distress  Lungs: Clear to auscultation bilaterally, respirations unlabored  Heart: Regular rate and rhythm, S1 and S2 normal, no obvious murmurs  Abdomen:         Soft, non-tender, bowel sounds normal, no masses, no organomegaly        Labs:  Lab Results   Component Value Date    WBC 5.02 11/19/2024    HGB 13.0 11/19/2024    HCT 41.0 11/19/2024    MCV 82.5 11/19/2024     11/19/2024     No results found for: "INR", "PT", "APTT"  Lab Results   Component Value Date     11/19/2024    K 4.1 11/19/2024    CO2 24 11/19/2024    BUN 8.4 11/19/2024    CREATININE 0.86 11/19/2024    LABPROT 7.9 11/19/2024    ALBUMIN 4.1 11/19/2024    BILITOT 0.5 11/19/2024    ALKPHOS 43 11/19/2024    ALT 15 11/19/2024    AST 15 11/19/2024         Assessment and Plan:    History reviewed, vital signs satisfactory, cardiopulmonary status satisfactory.  I have " explained the sedation options, risks, benefits, and alternatives of this endoscopic procedure to the patient including but not limited to bleeding, inflammation, infection, perforation, and death.  All questions were answered and the patient consented to proceed with procedure as planned.   The patient is deemed an appropriate candidate for the sedation as planned.      Low Fairchild MD   of Clinical Medicine  Gastroenterology and Hepatology  [facility]    2/13/2025  8:19 AM

## 2025-02-13 NOTE — PLAN OF CARE
Procedure complete -returned to room- awake alert- denies discomfort at this time-tolerating sprite-daughter at bedside

## 2025-02-13 NOTE — TRANSFER OF CARE
"Anesthesia Transfer of Care Note    Patient: Tara Potts    Procedure(s) Performed: Procedure(s) (LRB):  COLONOSCOPY (N/A)    Patient location: GI    Anesthesia Type: general    Post pain: adequate analgesia    Post assessment: no apparent anesthetic complications    Post vital signs: stable    Level of consciousness: sedated    Nausea/Vomiting: no nausea/vomiting    Complications: none    Transfer of care protocol was followed      Last vitals: Visit Vitals  BP (!) 126/97 (BP Location: Left arm, Patient Position: Lying)   Pulse 83   Temp 36.7 °C (98.1 °F) (Oral)   Resp 16   Ht 5' 6" (1.676 m)   Wt 116.1 kg (256 lb)   SpO2 98%   Breastfeeding No   BMI 41.32 kg/m²     "

## 2025-02-14 VITALS
WEIGHT: 256 LBS | RESPIRATION RATE: 18 BRPM | HEIGHT: 66 IN | DIASTOLIC BLOOD PRESSURE: 85 MMHG | TEMPERATURE: 98 F | HEART RATE: 68 BPM | OXYGEN SATURATION: 100 % | SYSTOLIC BLOOD PRESSURE: 117 MMHG | BODY MASS INDEX: 41.14 KG/M2

## 2025-03-17 ENCOUNTER — PATIENT MESSAGE (OUTPATIENT)
Dept: GYNECOLOGY | Facility: CLINIC | Age: 50
End: 2025-03-17
Payer: COMMERCIAL

## 2025-03-18 NOTE — TELEPHONE ENCOUNTER
Called patient and she did not answer. I sent the patient a message through the patient portal and is awaiting a response.

## 2025-05-09 ENCOUNTER — HOSPITAL ENCOUNTER (EMERGENCY)
Facility: HOSPITAL | Age: 50
Discharge: HOME | End: 2025-05-09
Attending: EMERGENCY MEDICINE

## 2025-05-09 VITALS
RESPIRATION RATE: 20 BRPM | BODY MASS INDEX: 40.34 KG/M2 | OXYGEN SATURATION: 99 % | HEART RATE: 83 BPM | DIASTOLIC BLOOD PRESSURE: 92 MMHG | HEIGHT: 66 IN | TEMPERATURE: 98 F | SYSTOLIC BLOOD PRESSURE: 153 MMHG | WEIGHT: 251 LBS

## 2025-05-09 DIAGNOSIS — R07.9 CHEST PAIN: ICD-10-CM

## 2025-05-09 DIAGNOSIS — R31.9 HEMATURIA, UNSPECIFIED TYPE: ICD-10-CM

## 2025-05-09 DIAGNOSIS — M54.2 NECK PAIN: ICD-10-CM

## 2025-05-09 DIAGNOSIS — N20.0 NEPHROLITHIASIS: ICD-10-CM

## 2025-05-09 DIAGNOSIS — M54.12 CERVICAL RADICULOPATHY AT C5: Primary | ICD-10-CM

## 2025-05-09 LAB
ALBUMIN SERPL-MCNC: 3.9 G/DL (ref 3.5–5)
ALBUMIN/GLOB SERPL: 1 RATIO (ref 1.1–2)
ALP SERPL-CCNC: 48 UNIT/L (ref 40–150)
ALT SERPL-CCNC: 15 UNIT/L (ref 0–55)
ANION GAP SERPL CALC-SCNC: 8 MEQ/L
AST SERPL-CCNC: 16 UNIT/L (ref 11–45)
B-HCG UR QL: NEGATIVE
BACTERIA #/AREA URNS AUTO: ABNORMAL /HPF
BASOPHILS # BLD AUTO: 0.03 X10(3)/MCL
BASOPHILS NFR BLD AUTO: 0.6 %
BILIRUB SERPL-MCNC: 0.3 MG/DL
BILIRUB UR QL STRIP.AUTO: NEGATIVE
BUN SERPL-MCNC: 14.7 MG/DL (ref 7–18.7)
CALCIUM SERPL-MCNC: 9.3 MG/DL (ref 8.4–10.2)
CHLORIDE SERPL-SCNC: 111 MMOL/L (ref 98–107)
CLARITY UR: ABNORMAL
CO2 SERPL-SCNC: 28 MMOL/L (ref 22–29)
COLOR UR AUTO: ABNORMAL
CREAT SERPL-MCNC: 1.01 MG/DL (ref 0.55–1.02)
CREAT/UREA NIT SERPL: 15
EOSINOPHIL # BLD AUTO: 0.2 X10(3)/MCL (ref 0–0.9)
EOSINOPHIL NFR BLD AUTO: 4.2 %
ERYTHROCYTE [DISTWIDTH] IN BLOOD BY AUTOMATED COUNT: 13.5 % (ref 11.5–17)
GFR SERPLBLD CREATININE-BSD FMLA CKD-EPI: >60 ML/MIN/1.73/M2
GLOBULIN SER-MCNC: 3.9 GM/DL (ref 2.4–3.5)
GLUCOSE SERPL-MCNC: 85 MG/DL (ref 74–100)
GLUCOSE UR QL STRIP: NEGATIVE
HCT VFR BLD AUTO: 39.9 % (ref 37–47)
HGB BLD-MCNC: 12.5 G/DL (ref 12–16)
HGB UR QL STRIP: ABNORMAL
IMM GRANULOCYTES # BLD AUTO: 0 X10(3)/MCL (ref 0–0.04)
IMM GRANULOCYTES NFR BLD AUTO: 0 %
KETONES UR QL STRIP: ABNORMAL
LEUKOCYTE ESTERASE UR QL STRIP: NEGATIVE
LYMPHOCYTES # BLD AUTO: 2.08 X10(3)/MCL (ref 0.6–4.6)
LYMPHOCYTES NFR BLD AUTO: 43.7 %
MCH RBC QN AUTO: 25.7 PG (ref 27–31)
MCHC RBC AUTO-ENTMCNC: 31.3 G/DL (ref 33–36)
MCV RBC AUTO: 81.9 FL (ref 80–94)
MONOCYTES # BLD AUTO: 0.28 X10(3)/MCL (ref 0.1–1.3)
MONOCYTES NFR BLD AUTO: 5.9 %
NEUTROPHILS # BLD AUTO: 2.17 X10(3)/MCL (ref 2.1–9.2)
NEUTROPHILS NFR BLD AUTO: 45.6 %
NITRITE UR QL STRIP: NEGATIVE
NRBC BLD AUTO-RTO: 0 %
OHS QRS DURATION: 88 MS
OHS QTC CALCULATION: 444 MS
PH UR STRIP: 6 [PH]
PLATELET # BLD AUTO: 251 X10(3)/MCL (ref 130–400)
PMV BLD AUTO: 9.3 FL (ref 7.4–10.4)
POTASSIUM SERPL-SCNC: 4.5 MMOL/L (ref 3.5–5.1)
PROT SERPL-MCNC: 7.8 GM/DL (ref 6.4–8.3)
PROT UR QL STRIP: 30
RBC # BLD AUTO: 4.87 X10(6)/MCL (ref 4.2–5.4)
RBC #/AREA URNS AUTO: >100 /HPF
SODIUM SERPL-SCNC: 147 MMOL/L (ref 136–145)
SP GR UR STRIP.AUTO: >=1.03 (ref 1–1.03)
SQUAMOUS #/AREA URNS AUTO: ABNORMAL /HPF
UROBILINOGEN UR STRIP-ACNC: 1
WBC # BLD AUTO: 4.76 X10(3)/MCL (ref 4.5–11.5)
WBC #/AREA URNS AUTO: ABNORMAL /HPF

## 2025-05-09 PROCEDURE — 85025 COMPLETE CBC W/AUTO DIFF WBC: CPT | Performed by: EMERGENCY MEDICINE

## 2025-05-09 PROCEDURE — 93005 ELECTROCARDIOGRAM TRACING: CPT

## 2025-05-09 PROCEDURE — 81003 URINALYSIS AUTO W/O SCOPE: CPT | Performed by: EMERGENCY MEDICINE

## 2025-05-09 PROCEDURE — 80053 COMPREHEN METABOLIC PANEL: CPT | Performed by: EMERGENCY MEDICINE

## 2025-05-09 PROCEDURE — 93010 ELECTROCARDIOGRAM REPORT: CPT | Mod: ,,, | Performed by: INTERNAL MEDICINE

## 2025-05-09 PROCEDURE — 81025 URINE PREGNANCY TEST: CPT | Performed by: EMERGENCY MEDICINE

## 2025-05-09 PROCEDURE — 99285 EMERGENCY DEPT VISIT HI MDM: CPT | Mod: 25

## 2025-05-09 PROCEDURE — 63600175 PHARM REV CODE 636 W HCPCS: Mod: JZ,TB | Performed by: EMERGENCY MEDICINE

## 2025-05-09 PROCEDURE — 96372 THER/PROPH/DIAG INJ SC/IM: CPT | Performed by: EMERGENCY MEDICINE

## 2025-05-09 RX ORDER — PREDNISONE 10 MG/1
20 TABLET ORAL DAILY
Qty: 10 TABLET | Refills: 0 | Status: SHIPPED | OUTPATIENT
Start: 2025-05-09 | End: 2025-05-14

## 2025-05-09 RX ORDER — CYCLOBENZAPRINE HCL 10 MG
10 TABLET ORAL 3 TIMES DAILY PRN
Qty: 15 TABLET | Refills: 0 | Status: SHIPPED | OUTPATIENT
Start: 2025-05-09 | End: 2025-05-14

## 2025-05-09 RX ORDER — KETOROLAC TROMETHAMINE 30 MG/ML
60 INJECTION, SOLUTION INTRAMUSCULAR; INTRAVENOUS
Status: COMPLETED | OUTPATIENT
Start: 2025-05-09 | End: 2025-05-09

## 2025-05-09 RX ADMIN — KETOROLAC TROMETHAMINE 60 MG: 60 INJECTION, SOLUTION INTRAMUSCULAR at 03:05

## 2025-05-09 NOTE — DISCHARGE INSTRUCTIONS
Begin neck stretches this discussed today once the pain is little bit better.  This will help the symptoms not reoccur with your arthritic changes in your neck already pre-existing.  Given prednisone to take for 5 days starting tomorrow morning  You may take Flexeril for breakthrough pain if it persists but goes down your arm.  Increase water and stay hydrated to help flush her kidneys.  This helped small stones passed without being noticed.  Avoid overhead work as this may make her neck issues worse.  Be sure of the easily out of firm pillow and a flat supportive mattress.  Call to be seen by your family doctor for recheck this week and for referral if your arm symptoms  persist.

## 2025-05-09 NOTE — Clinical Note
"Tara"Debbie Potts was seen and treated in our emergency department on 5/9/2025.  She may return to work on 05/19/2025.       If you have any questions or concerns, please don't hesitate to call.      Melyssa Guillen, DO"

## 2025-05-09 NOTE — ED PROVIDER NOTES
Encounter Date: 2025       History     Chief Complaint   Patient presents with    Arm Pain     Pt c/o pain to left arm onset three weeks ago, denies injury. States now has chest tightness. Pt also c/o having blood in urine onset this morning.     HPI patient with multiple complaints. She c/o numbness and weakness she has to shake out of left arm intermittently. No neck pain but has had some chest tightness at times. She also c/o right flank pain and noticing blood in urin for last few days. No nausea or vomiting. Does admit to having a knot in upper trapezius region before left arm pain came on. Is right hand dominant. She does use arms a lot at work.  Review of patient's allergies indicates:   Allergen Reactions    Sulfa (sulfonamide antibiotics) Hives, Itching, Nausea Only, Palpitations, Rash and Shortness Of Breath     Past Medical History:   Diagnosis Date    Abnormal Pap smear of cervix     Breast disorder     Hypertension     Osteopenia      Past Surgical History:   Procedure Laterality Date     SECTION      COLONOSCOPY  2020    Milad Wynn MD    COLONOSCOPY N/A 2025    Procedure: COLONOSCOPY;  Surgeon: Low Fairchild MD;  Location: [facility] ENDOSCOPY;  Service: Endoscopy;  Laterality: N/A;    CRYOTHERAPY      ESOPHAGOGASTRODUODENOSCOPY      TUBAL LIGATION       Family History   Problem Relation Name Age of Onset    Hypertension Mother Alpa Potts     Diabetes Mother Alpa Potts     Arthritis Mother Alpa Potts     Heart disease Father Thiago Thurman     Alcohol abuse Father Thiago Thurman     Early death Father Thiago Thurman     Heart failure Father Thiago Thurman     Hypertension Sister Stephani Caffery     Drug abuse Son Wu Thurman     Colon cancer Maternal Uncle See North     Colon cancer Maternal Grandmother Vianney Galvez     Cancer Maternal Grandmother Vianney Galvez     Cancer Maternal Grandfather Parviz Josephussard     Stomach  cancer Maternal Grandfather Parviz North      Social History[1]  Review of Systems   Constitutional:  Positive for activity change.   HENT: Negative.     Respiratory:  Positive for chest tightness.    Cardiovascular: Negative.    Gastrointestinal:  Positive for abdominal pain.   Genitourinary:  Positive for hematuria.   Musculoskeletal:  Positive for arthralgias, myalgias and neck pain.   Neurological: Negative.    Psychiatric/Behavioral: Negative.     All other systems reviewed and are negative.      Physical Exam     Initial Vitals [05/09/25 1336]   BP Pulse Resp Temp SpO2   (!) 153/92 83 20 97.7 °F (36.5 °C) 99 %      MAP       --         Physical Exam    Nursing note and vitals reviewed.  Constitutional: She appears well-developed and well-nourished.   HENT:   Head: Normocephalic and atraumatic.   Eyes: EOM are normal. Pupils are equal, round, and reactive to light.   Neck: Neck supple.   Soreness to trapezius muscle earlier in week   Normal range of motion.  Cardiovascular:  Normal rate, regular rhythm and normal heart sounds.           Pulmonary/Chest: Breath sounds normal.   Abdominal: Abdomen is soft. Bowel sounds are normal.   Right side /flank pain,no rebound or guarding   Musculoskeletal:      Cervical back: Normal range of motion and neck supple.      Comments: FROM and good pulses UE /bl, all tendon and nerve function intact, sensation intact. No focal defecit     Neurological: She is alert and oriented to person, place, and time.   Skin: Skin is warm and dry.   Psychiatric: She has a normal mood and affect.         ED Course   Procedures  Labs Reviewed   URINALYSIS, REFLEX TO URINE CULTURE - Abnormal       Result Value    Color, UA Red (*)     Appearance, UA Cloudy (*)     Specific Gravity, UA >=1.030      pH, UA 6.0      Protein, UA 30 (*)     Glucose, UA Negative      Ketones, UA Trace (*)     Blood, UA Large (*)     Bilirubin, UA Negative      Urobilinogen, UA 1.0      Nitrites, UA Negative       Leukocyte Esterase, UA Negative     URINALYSIS, MICROSCOPIC - Abnormal    Bacteria, UA None Seen      RBC, UA >100 (*)     WBC, UA None Seen      Squamous Epithelial Cells, UA Rare     COMPREHENSIVE METABOLIC PANEL - Abnormal    Sodium 147 (*)     Potassium 4.5      Chloride 111 (*)     CO2 28      Glucose 85      Blood Urea Nitrogen 14.7      Creatinine 1.01      Calcium 9.3      Protein Total 7.8      Albumin 3.9      Globulin 3.9 (*)     Albumin/Globulin Ratio 1.0 (*)     Bilirubin Total 0.3      ALP 48      ALT 15      AST 16      eGFR >60      Anion Gap 8.0      BUN/Creatinine Ratio 15     CBC WITH DIFFERENTIAL - Abnormal    WBC 4.76      RBC 4.87      Hgb 12.5      Hct 39.9      MCV 81.9      MCH 25.7 (*)     MCHC 31.3 (*)     RDW 13.5      Platelet 251      MPV 9.3      Neut % 45.6      Lymph % 43.7      Mono % 5.9      Eos % 4.2      Basophil % 0.6      Imm Grans % 0.0      Neut # 2.17      Lymph # 2.08      Mono # 0.28      Eos # 0.20      Baso # 0.03      Imm Gran # 0.00      NRBC% 0.0     PREGNANCY TEST, URINE RAPID - Normal    hCG Qualitative, Urine Negative     CBC W/ AUTO DIFFERENTIAL    Narrative:     The following orders were created for panel order CBC auto differential.  Procedure                               Abnormality         Status                     ---------                               -----------         ------                     CBC with Differential[[id number]]       Abnormal            Final result                 Please view results for these tests on the individual orders.          Imaging Results              X-Ray Chest PA And Lateral (Final result)  Result time 05/09/25 15:12:20      Final result by Chester Peña MD (05/09/25 15:12:20)                   Impression:      No acute cardiopulmonary process identified.      Electronically signed by: Chester Peña  Date:    05/09/2025  Time:    15:12               Narrative:    EXAMINATION:  XR CHEST PA AND LATERAL    CLINICAL  HISTORY:  Chest pain, unspecified    TECHNIQUE:  Two views    COMPARISON:  January 17, 2022.    FINDINGS:  Cardiopericardial silhouette is within normal limits. Lungs are without dense focal or segmental consolidation, congestion, pleural effusion or pneumothorax.                                       X-Ray Cervical Spine AP And Lateral (Final result)  Result time 05/09/25 15:16:52      Final result by Nicho Corbett MD (05/09/25 15:16:52)                   Impression:      Straightening of the normal curvature of the cervical spine.    Minimal degenerative changes      Electronically signed by: Nicho Corbett  Date:    05/09/2025  Time:    15:16               Narrative:    EXAMINATION:  XR CERVICAL SPINE AP LATERAL    CPT: 67489    CLINICAL HISTORY:  Cervicalgia    FINDINGS:  There is straightening of the normal curvature of the cervical spine with slight reversal there are some degenerative changes with marginal osteophytes specially at C4 and C5 articular spaces otherwise preserved with smooth articular surfaces                                       CT Renal Stone Study ABD Pelvis WO (Final result)  Result time 05/09/25 15:19:08      Final result by Carloz Sawyer MD (05/09/25 15:19:08)                   Impression:      Nonobstructing nephrolithiasis.      Electronically signed by: Carloz Sawyer  Date:    05/09/2025  Time:    15:19               Narrative:    EXAMINATION:  CT RENAL STONE STUDY ABD PELVIS WO    CLINICAL HISTORY:  Flank pain, kidney stone suspected;    TECHNIQUE:  CT imaging of the abdomen and pelvis without intravenous contrast. Axial, coronal and sagittal reformatted images reviewed. Dose length product is 708 mGycm. Automatic exposure control, adjustment of mA/kV or iterative reconstruction technique used to limit radiation dose.    COMPARISON:  CT 10/02/2024    FINDINGS:  Assessment of the visceral organs and vasculature is limited by the lack of IV contrast.    Liver/biliary: No  concerning hepatic findings. No biliary dilatation.    Pancreas: Normal.    Spleen: Normal.    Adrenals: Normal.    Genitourinary: Bilateral nephrolithiasis.  Stones measure up to 13 mm.  No ureteral stone or hydronephrosis identified.  Bladder within normal limits. Uterus and adnexa have normal nonenhanced appearances.    Stomach/bowel: No bowel obstruction. Normal appendix. No discernible bowel inflammation.    Lymph nodes and peritoneum: No pathologically enlarged lymph node identified with noncontrast technique. No ascites or free air.    Vasculature: Normal abdominal aortic caliber.    Abdominal wall: Suspect minimal post surgical changes in the lower anterior abdominal wall.    Lung bases: No consolidation or pleural effusion.    Bones: No acute osseous findings.                                       Medications   ketorolac injection 60 mg (60 mg Intramuscular Given 5/9/25 1520)     Medical Decision Making  Differential would include cervical radiculopathy versus atypical chest pain versus nephrolithiasis versus myofascial spasm  Medication list reviewed.  EKG reviewed by me shows no ischemic changes.  Comorbidities and social determinants of health were reviewed and are on the chart.  CBC and Chem 12 were reviewed by me.  They are stable.  Her urine does show blood in it but no evidence of infection or crystals.  A CAT scan renal stone was ordered by me and it shows no obstructing stone.  Just stones in both kidneys.  C-spine does show cervical osteophytes at C C4-C5 which may be causing the pain down her arm.  I will give her instructions for home care and treatment for this.  Chest film was negative  On recheck symptoms have improved.      Amount and/or Complexity of Data Reviewed  Labs: ordered. Decision-making details documented in ED Course.  Radiology: ordered.  ECG/medicine tests: ordered and independent interpretation performed.    Risk  Prescription drug management.                                       Clinical Impression:  Cervical radiculopathy, flank pain, hematuria  Final diagnoses:  [R07.9] Chest pain  [M54.2] Neck pain  [M54.12] Cervical radiculopathy at C5 (Primary)  [R31.9] Hematuria, unspecified type  [N20.0] Nephrolithiasis          ED Disposition Condition    Discharge Stable          ED Prescriptions       Medication Sig Dispense Start Date End Date Auth. Provider    predniSONE (DELTASONE) 10 MG tablet Take 2 tablets (20 mg total) by mouth once daily. for 5 days 10 tablet 5/9/2025 5/14/2025 Melyssa Guillen DO    cyclobenzaprine (FLEXERIL) 10 MG tablet Take 1 tablet (10 mg total) by mouth 3 (three) times daily as needed for Muscle spasms. 15 tablet 5/9/2025 5/14/2025 Melyssa Guillen DO          Follow-up Information       Follow up With Specialties Details Why Contact Info    Delvin Connolly MD Pulmonary Disease, Internal Medicine In 1 week if symptons persist and /or worsen [address] [facility] [address]  [phone]               Melyssa Guillen DO  05/09/25 1606         [1]   Social History  Tobacco Use    Smoking status: Never     Passive exposure: Never    Smokeless tobacco: Never   Substance Use Topics    Alcohol use: Not Currently     Comment: socially     Drug use: Never        Melyssa Guillen DO  05/09/25 1606

## 2025-08-25 ENCOUNTER — TELEPHONE (OUTPATIENT)
Dept: GYNECOLOGY | Facility: CLINIC | Age: 50
End: 2025-08-25
Payer: COMMERCIAL

## 2025-09-03 DIAGNOSIS — M79.7 FIBROMYALGIA: ICD-10-CM

## 2025-09-04 RX ORDER — GABAPENTIN 300 MG/1
300 CAPSULE ORAL 3 TIMES DAILY
Qty: 90 CAPSULE | Refills: 0 | Status: SHIPPED | OUTPATIENT
Start: 2025-09-04

## (undated) DEVICE — KIT SURGICAL COLON .25 1.1OZ

## (undated) DEVICE — MANIFOLD 4 PORT